# Patient Record
Sex: FEMALE | Race: AMERICAN INDIAN OR ALASKA NATIVE
[De-identification: names, ages, dates, MRNs, and addresses within clinical notes are randomized per-mention and may not be internally consistent; named-entity substitution may affect disease eponyms.]

---

## 2021-02-02 NOTE — HISTORY AND PHYSICAL REPORT
History of Present Illness


Chief complaint: 


Im weak





History of present illness: 


54 YO Female with SLE, RA, HTN present to ED for evaluation. Pt states "I feel 

weak on my left side". Pt states that she has experienced weakness and numbness 

on her left side for the past 1 week with persistent symptoms over the same time

frame. Pt transported to [facility] via private vehicle for further care and evaluation

of the aforementioned symptoms. Pt seen and evaluated in ED. All labs and 

imaging studies reviewed. The patient was found to have a neurologic deficit and

a code stroke was called. The patient was placed in observation status and 

admitted to medical floor and initiated on CVA protocol. Pt is allergic to 

aspirin and was initiated on antiplatelet therapy with plavix. Teleneurology 

consulted in ED. Pt denies fever, chills, CP, Palpitations, NVD, Trauma, 

productive cough, recent ill contacts, or known exposure to COVID 19. 





Past History


Past Medical History: arthritis, hypertension, other (See HPI)


Past Surgical History: cholecystectomy


Social history: single.  denies: smoking, alcohol abuse, prescription drug abuse


Family history: hypertension





Medications and Allergies


                                    Allergies











Allergy/AdvReac Type Severity Reaction Status Date / Time


 


aspirin Allergy  Unknown Verified 02/02/21 13:31


 


lisinopril Allergy  Unknown Verified 02/02/21 13:31


 


Penicillins Allergy  Swelling Verified 02/02/21 13:31


 


Sulfa (Sulfonamide Allergy  Unknown Verified 02/02/21 13:31





Antibiotics)     














Review of Systems


Constitutional: no weight loss, no weight gain, no fever, no chills


Ears, nose, mouth and throat: no ear pain, no ear discharge, no tinnitis, no 

decreased hearing, no nose pain


Breasts: no change in shape, no swelling, no mass


Cardiovascular: no chest pain, no orthopnea, no palpitations, no rapid/irregular

heart beat, no edema


Respiratory: no cough, no hemoptysis, no shortness of breath


Gastrointestinal: no abdominal pain, no nausea, no vomiting, no change in bowel 

habits, no hematemesis


Genitourinary Female: no pelvic pain, no flank pain, no dysuria, no urinary 

frequency, no urgency


Rectal: no pain, no incontinence, no bleeding


Musculoskeletal: no neck pain, no shooting arm pain, no arm numbness/tingling


Integumentary: no rash, no pruritis, no redness, no sores, no wounds


Neurological: no transient paralysis, no paralysis, no parathesias, no numbness,

no tingling, no seizures


Psychiatric: no anxiety, no change in sleep habits, no insomnia, no hypersomnia,

no change in libido, no disorientation


Endocrine: no cold intolerance, no polydipsia, no excessive sweating


Hematologic/Lymphatic: no easy bruising, no lymphadenopathy, no lymphedema


Allergic/Immunologic: no allergic rhinitis, no persistent infections, no 

angioedema





Exam





- Constitutional


Vitals: 


                                        











Temp Pulse Resp BP Pulse Ox


 


 98 F   73   20   182/114   100 


 


 02/02/21 13:41  02/02/21 13:37  02/02/21 13:37  02/02/21 13:37  02/02/21 13:37











General appearance: Present: mild distress





- EENT


Eyes: Present: PERRL


ENT: hearing intact, clear oral mucosa





- Neck


Neck: Present: supple, normal ROM





- Respiratory


Respiratory effort: normal


Respiratory: bilateral: CTA





- Cardiovascular


Heart Sounds: Present: S1 & S2.  Absent: rub, click





- Extremities


Extremities: pulses symmetrical, No edema


Peripheral Pulses: within normal limits





- Abdominal


General gastrointestinal: Present: soft, non-tender, non-distended, normal bowel

sounds


Female genitourinary: Present: normal





- Integumentary


Integumentary: Present: clear, warm, dry





- Musculoskeletal


Musculoskeletal: left sided weakness





- Psychiatric


Psychiatric: appropriate mood/affect, intact judgment & insight





- Neurologic


Neurologic: CNII-XII intact, moves all extremities





Results





- Labs


CBC & Chem 7: 


                                 02/02/21 13:57





                                 02/02/21 14:12


Labs: 


                              Abnormal lab results











  02/02/21 02/02/21 02/02/21 Range/Units





  13:38 13:57 14:12 


 


WBC   2.9 L   (4.5-11.0)  K/mm3


 


Lymph % (Auto)   39.9 H   (13.4-35.0)  %


 


Mono % (Auto)   13.2 H   (0.0-7.3)  %


 


Lymph # (Auto)   1.1 L   (1.2-5.4)  K/mm3


 


Seg Neutrophils #   1.3 L   (1.8-7.7)  K/mm3


 


POC Glucose  61 L    ()  mg/dL


 


CK-MB (CK-2)    4.2 H  (0.0-4.0)  ng/mL


 


CK-MB (CK-2) Rel Index    5.8 H  (0-4)  


 


Total Protein    9.5 H  (6.3-8.2)  g/dL














Assessment and Plan





- Patient Problems


(1) CVA (cerebral vascular accident)


Current Visit: Yes   Status: Acute   


Qualifiers: 


   CVA mechanism: occlusion   Precerebral and cerebral artery: posterior c

erebral artery   Laterality of affected vessel: left   Qualified Code(s): 

I63.532 - Cerebral infarction due to unspecified occlusion or stenosis of left 

posterior cerebral artery   


Plan to address problem: 


CVA Protocol: Physical therapy consulted, Speech therapy consulted, Occupational

therapy consulted, Antiplatelet therapy, CT Head, neuro check, lipid panel, 

statin therapy. 








(2) SLE (systemic lupus erythematosus related syndrome)


Current Visit: Yes   Status: Acute   


Plan to address problem: 


Continue plaquenil, supportive care, outpatient rheumatology f/u 








(3) Accelerated hypertension


Current Visit: Yes   Status: Acute   


Plan to address problem: 


Monitor BP q shift, continue medical management








(4) Rheumatoid arthritis


Current Visit: Yes   Status: Acute   


Plan to address problem: 


supportive care, continue medical management.








(5) DVT prophylaxis


Current Visit: Yes   Status: Acute   


Plan to address problem: 


SCD to BLE while in bed, supportive care.

## 2021-02-02 NOTE — EMERGENCY DEPARTMENT REPORT
ED Neuro Deficit HPI





- General


Chief Complaint: Neuro Symptoms/Deficit


Stated Complaint: POSS STROKE


Time Seen by Provider: 02/02/21 13:43


Source: patient


Mode of arrival: Wheelchair


Limitations: No Limitations





- History of Present Illness


Initial Comments: 


This is a 55-year-old female with a history of hypertension and lupus who states

that she has had weakness and numbness of her left side for 1 week.  She does 

not describe the symptoms as progressive nor does she complain of headache.  

Apparently she called the [facility] yesterday for advice.  She, has not been 

seen by medical provider during this period of time.  She was a little vague 

about whether or not she has had neuro symptoms related to her lupus in the 

past.  However largely I think she is saying that she has not.  At the same time

she tells me that she was "in a coma before".  As far as I can tell she required

intubation for ACE inhibitor related angioedema and was probably sedated for 

some time.  She is a very poor historian but is able to give general 

information.  She has not been to this facility apparently prior and there is no

[facility] records.





Patient states that her numbness involves her entire left hemicorporal body.  

She states that she has not been able to walk.  She states that she has little 

to no use of her left upper extremity.  At times she has slurring of the speech 

she states.  At the time my encounter it is really undetectable.





Patient denies a history of her lupus affecting her kidneys.  She states that 

her current lupus medicine is Plaquenil.  She states that she is compliant with 

her amlodipine/HCTZ.


-: Gradual, week(s)


Location: speech, left face, left arm


Presenting Symptoms: Present: Weak/Paralyzed One Side


History of same: No


Place: home


Severity: severe


Quality: weak, numb


Improves With: none


Worsens With: none


On Anticoagulants: No


Context: gradual onset


Associated Symptoms: denies other symptoms


Treatments Prior to Arrival: none





- Related Data


Allergies/Adverse Reactions: 


                                    Allergies











Allergy/AdvReac Type Severity Reaction Status Date / Time


 


aspirin Allergy  Unknown Verified 02/02/21 13:31


 


lisinopril Allergy  Unknown Verified 02/02/21 13:31


 


Penicillins Allergy  Swelling Verified 02/02/21 13:31


 


Sulfa (Sulfonamide Allergy  Unknown Verified 02/02/21 13:31





Antibiotics)     














ED Review of Systems


ROS: 


Stated complaint: POSS STROKE


Other details as noted in HPI








ED Past Medical Hx





- Past Medical History


Hx Hypertension: Yes


Hx Arthritis: Yes (RA)


Additional medical history: LUPUS/ THYROID





- Surgical History


Hx Cholecystectomy: Yes





- Social History


Smoking Status: Never Smoker


Substance Use Type: None





ED Neuro Physical Exam





- General


Limitations: No Limitations


General appearance: alert, in no apparent distress


Suspected Stroke: Yes





- Head


Head exam: Present: atraumatic, normocephalic





- Eye


Eye exam: Present: normal appearance.  Absent: scleral icterus





- ENT


ENT exam: Present: mucous membranes moist, other (Perhaps slight facial asym

metry left nasolabial fold slightly lower)





- Neck


Neck exam: Present: normal inspection.  Absent: tenderness, meningismus





- Respiratory


Respiratory exam: Present: normal lung sounds bilaterally.  Absent: respiratory 

distress





- Cardiovascular


Cardiovascular Exam: Present: regular rate, normal rhythm.  Absent: systolic 

murmur, diastolic murmur, rubs, gallop





- GI/Abdominal


GI/Abdominal exam: Present: soft, normal bowel sounds.  Absent: distended, 

tenderness, guarding, rebound, rigid





- Extremities Exam


Extremities exam: Present: normal inspection





- Back Exam


Back exam: Present: normal inspection





- Neurological Exam


Neurological exam: Present: alert, oriented X3, CN II-XII intact (Question 

slight asymmetry left), motor sensory deficit





- NIHSS


Assessment Interval: Baseline


1a. Level of Consciousness: alert/keenly responsive


1b. LOC Questions: answers both correctly


1c. LOC Commands: performs tasks correctly


2. Best Gaze: normal


3. Visual: no visual loss


4. Facial Palsy: normal symmetrical movement


5b. Motor Arm Right: no drift


5a. Motor Arm Left: no movement


6a. Motor Leg Left: no gravity effort


6b. Motor Leg Right: no drift


7. Limb Ataxia: absent


8. Sensory: mild/moderate sensory loss


9. Best Language: no aphasia


10. Dysarthria: normal


11. Extinction/Inattention: no abnormality


Total Score: 8


Stroke Severity: Moderate Stroke





- Psychiatric


Psychiatric exam: Present: normal affect, normal mood





- Skin


Skin exam: Present: warm, dry, intact, normal color.  Absent: rash





ED Course


                                   Vital Signs











  02/02/21 02/02/21





  13:37 13:41


 


Temperature  98 F


 


Pulse Rate 73 


 


Respiratory 20 





Rate  


 


Blood Pressure 182/114 


 


O2 Sat by Pulse 100 





Oximetry  














- Reevaluation(s)


Reevaluation #1: 


Telemetry neuro consult is requested.


02/02/21 14:36





Reevaluation #2: 


Discussed with telemetry neuro.  They will see patient and make recommendations.

 Discussed with hospitalist.  He will see patient and make disposition.  I will 

hold off on aspirin because the patient is allergic.  We will defer Plavix and 

possible other work-up and therapy to the above physicians.


02/02/21 15:06





Reevaluation #3: 


Discussed with teleneurologist.  See their note for recommendations.  Further 

care per hospitalist staff.


02/02/21 15:28








- Lab Data


Result diagrams: 


                                 02/02/21 13:57





                                 02/02/21 14:12


                                   Lab Results











  02/02/21 02/02/21 02/02/21 Range/Units





  13:38 13:57 13:57 


 


WBC   2.9 L   (4.5-11.0)  K/mm3


 


RBC   4.42   (3.65-5.03)  M/mm3


 


Hgb   14.0   (10.1-14.3)  gm/dl


 


Hct   41.2   (30.3-42.9)  %


 


MCV   93   (79-97)  fl


 


MCH   32   (28-32)  pg


 


MCHC   34   (30-34)  %


 


RDW   13.7   (13.2-15.2)  %


 


Plt Count   249   (140-440)  K/mm3


 


Lymph % (Auto)   39.9 H   (13.4-35.0)  %


 


Mono % (Auto)   13.2 H   (0.0-7.3)  %


 


Eos % (Auto)   0.4   (0.0-4.3)  %


 


Baso % (Auto)   0.4   (0.0-1.8)  %


 


Lymph # (Auto)   1.1 L   (1.2-5.4)  K/mm3


 


Mono # (Auto)   0.4   (0.0-0.8)  K/mm3


 


Eos # (Auto)   0.0   (0.0-0.4)  K/mm3


 


Baso # (Auto)   0.0   (0.0-0.1)  K/mm3


 


Seg Neutrophils %   46.1   (40.0-70.0)  %


 


Seg Neutrophils #   1.3 L   (1.8-7.7)  K/mm3


 


PT    12.2  (12.2-14.9)  Sec.


 


INR    0.92  (0.87-1.13)  


 


APTT    26.9  (24.2-36.6)  Sec.


 


Sodium     (137-145)  mmol/L


 


Potassium     (3.6-5.0)  mmol/L


 


Chloride     ()  mmol/L


 


Carbon Dioxide     (22-30)  mmol/L


 


Anion Gap     mmol/L


 


BUN     (7-17)  mg/dL


 


Creatinine     (0.6-1.2)  mg/dL


 


Estimated GFR     ml/min


 


BUN/Creatinine Ratio     %


 


Glucose     ()  mg/dL


 


POC Glucose  61 L    ()  mg/dL


 


Calcium     (8.4-10.2)  mg/dL


 


Magnesium     (1.7-2.3)  mg/dL


 


Total Bilirubin     (0.1-1.2)  mg/dL


 


Direct Bilirubin     (0-0.2)  mg/dL


 


Indirect Bilirubin     mg/dL


 


AST     (5-40)  units/L


 


ALT     (7-56)  units/L


 


Alkaline Phosphatase     ()  units/L


 


Total Creatine Kinase     ()  units/L


 


CK-MB (CK-2)     (0.0-4.0)  ng/mL


 


CK-MB (CK-2) Rel Index     (0-4)  


 


C-Reactive Protein     (0.00-1.30)  mg/dL


 


Total Protein     (6.3-8.2)  g/dL


 


Albumin     (3.9-5)  g/dL


 


Albumin/Globulin Ratio     %














  02/02/21 02/02/21 Range/Units





  14:12 14:12 


 


WBC    (4.5-11.0)  K/mm3


 


RBC    (3.65-5.03)  M/mm3


 


Hgb    (10.1-14.3)  gm/dl


 


Hct    (30.3-42.9)  %


 


MCV    (79-97)  fl


 


MCH    (28-32)  pg


 


MCHC    (30-34)  %


 


RDW    (13.2-15.2)  %


 


Plt Count    (140-440)  K/mm3


 


Lymph % (Auto)    (13.4-35.0)  %


 


Mono % (Auto)    (0.0-7.3)  %


 


Eos % (Auto)    (0.0-4.3)  %


 


Baso % (Auto)    (0.0-1.8)  %


 


Lymph # (Auto)    (1.2-5.4)  K/mm3


 


Mono # (Auto)    (0.0-0.8)  K/mm3


 


Eos # (Auto)    (0.0-0.4)  K/mm3


 


Baso # (Auto)    (0.0-0.1)  K/mm3


 


Seg Neutrophils %    (40.0-70.0)  %


 


Seg Neutrophils #    (1.8-7.7)  K/mm3


 


PT    (12.2-14.9)  Sec.


 


INR    (0.87-1.13)  


 


APTT    (24.2-36.6)  Sec.


 


Sodium  138   (137-145)  mmol/L


 


Potassium  4.1   (3.6-5.0)  mmol/L


 


Chloride  105.2   ()  mmol/L


 


Carbon Dioxide  27   (22-30)  mmol/L


 


Anion Gap  10   mmol/L


 


BUN  8   (7-17)  mg/dL


 


Creatinine  0.7   (0.6-1.2)  mg/dL


 


Estimated GFR  > 60   ml/min


 


BUN/Creatinine Ratio  11   %


 


Glucose  91   ()  mg/dL


 


POC Glucose    ()  mg/dL


 


Calcium  10.2   (8.4-10.2)  mg/dL


 


Magnesium  2.00   (1.7-2.3)  mg/dL


 


Total Bilirubin  0.30   (0.1-1.2)  mg/dL


 


Direct Bilirubin  < 0.2   (0-0.2)  mg/dL


 


Indirect Bilirubin  0.1   mg/dL


 


AST  25   (5-40)  units/L


 


ALT  21   (7-56)  units/L


 


Alkaline Phosphatase  93   ()  units/L


 


Total Creatine Kinase  72   ()  units/L


 


CK-MB (CK-2)  4.2 H   (0.0-4.0)  ng/mL


 


CK-MB (CK-2) Rel Index  5.8 H   (0-4)  


 


C-Reactive Protein   0.10  (0.00-1.30)  mg/dL


 


Total Protein  9.5 H   (6.3-8.2)  g/dL


 


Albumin  4.4   (3.9-5)  g/dL


 


Albumin/Globulin Ratio  0.9   %











                         Laboratory Results - last 24 hr











  02/02/21 02/02/21





  13:57 13:57


 


WBC  2.9 L 


 


RBC  4.42 


 


Hgb  14.0 


 


Hct  41.2 


 


MCV  93 


 


MCH  32 


 


MCHC  34 


 


RDW  13.7 


 


Plt Count  249 


 


Lymph % (Auto)  39.9 H 


 


Mono % (Auto)  13.2 H 


 


Eos % (Auto)  0.4 


 


Baso % (Auto)  0.4 


 


Lymph # (Auto)  1.1 L 


 


Mono # (Auto)  0.4 


 


Eos # (Auto)  0.0 


 


Baso # (Auto)  0.0 


 


Seg Neutrophils %  46.1 


 


Seg Neutrophils #  1.3 L 


 


PT   12.2


 


INR   0.92


 


APTT   26.9











                         Laboratory Results - last 24 hr











  02/02/21 02/02/21 02/02/21





  13:38 13:57 13:57


 


WBC   2.9 L 


 


RBC   4.42 


 


Hgb   14.0 


 


Hct   41.2 


 


MCV   93 


 


MCH   32 


 


MCHC   34 


 


RDW   13.7 


 


Plt Count   249 


 


Lymph % (Auto)   39.9 H 


 


Mono % (Auto)   13.2 H 


 


Eos % (Auto)   0.4 


 


Baso % (Auto)   0.4 


 


Lymph # (Auto)   1.1 L 


 


Mono # (Auto)   0.4 


 


Eos # (Auto)   0.0 


 


Baso # (Auto)   0.0 


 


Seg Neutrophils %   46.1 


 


Seg Neutrophils #   1.3 L 


 


PT    12.2


 


INR    0.92


 


APTT    26.9


 


Sodium   


 


Potassium   


 


Chloride   


 


Carbon Dioxide   


 


Anion Gap   


 


BUN   


 


Creatinine   


 


Estimated GFR   


 


BUN/Creatinine Ratio   


 


Glucose   


 


POC Glucose  61 L  


 


Calcium   


 


Magnesium   


 


Total Bilirubin   


 


Direct Bilirubin   


 


Indirect Bilirubin   


 


AST   


 


ALT   


 


Alkaline Phosphatase   


 


Total Creatine Kinase   


 


CK-MB (CK-2)   


 


CK-MB (CK-2) Rel Index   


 


Total Protein   


 


Albumin   


 


Albumin/Globulin Ratio   














  02/02/21





  14:12


 


WBC 


 


RBC 


 


Hgb 


 


Hct 


 


MCV 


 


MCH 


 


MCHC 


 


RDW 


 


Plt Count 


 


Lymph % (Auto) 


 


Mono % (Auto) 


 


Eos % (Auto) 


 


Baso % (Auto) 


 


Lymph # (Auto) 


 


Mono # (Auto) 


 


Eos # (Auto) 


 


Baso # (Auto) 


 


Seg Neutrophils % 


 


Seg Neutrophils # 


 


PT 


 


INR 


 


APTT 


 


Sodium  138


 


Potassium  4.1


 


Chloride  105.2


 


Carbon Dioxide  27


 


Anion Gap  10


 


BUN  8


 


Creatinine  0.7


 


Estimated GFR  > 60


 


BUN/Creatinine Ratio  11


 


Glucose  91


 


POC Glucose 


 


Calcium  10.2


 


Magnesium  2.00


 


Total Bilirubin  0.30


 


Direct Bilirubin  < 0.2


 


Indirect Bilirubin  0.1


 


AST  25


 


ALT  21


 


Alkaline Phosphatase  93


 


Total Creatine Kinase  72


 


CK-MB (CK-2)  4.2 H


 


CK-MB (CK-2) Rel Index  5.8 H


 


Total Protein  9.5 H


 


Albumin  4.4


 


Albumin/Globulin Ratio  0.9














- EKG Data


-: EKG Interpreted by Me


EKG shows normal: sinus rhythm (First-degree), axis (Left foot axis), intervals,

ST-T waves


Rate: normal


Interpretation: LVH (LVH, associated repolarization abnormality, left atrial 

enlargement, first-degree AV block)





- Radiology Data


Radiology results: image reviewed (I do not see any evidence of an acute 

intracranial process.  Report is pending.)


No acute process seen by me or teleneurologist.  Report is pending.  Old 

occipital infarct.


Critical care attestation.: 


If time is entered above; I have spent that time in minutes in the direct care 

of this critically ill patient, excluding procedure time.








ED Disposition


Clinical Impression: 


CVA (cerebral vascular accident)


Qualifiers:


 CVA mechanism: unspecified Qualified Code(s): I63.9 - Cerebral infarction, 

unspecified





Disposition: DC-09 OP ADMIT IP TO THIS HOSP


Is pt being admited?: Yes


Does the pt Need Aspirin: No (Allergic to aspirin)


Condition: Stable


Referrals: 


PRIMARY CARE,MD [Primary Care Provider] - 3-5 Days


Time of Disposition: 15:29

## 2021-02-02 NOTE — EMERGENCY DEPARTMENT REPORT
ED Neuro Deficit HPI





- General


Chief Complaint: Neuro Symptoms/Deficit


Stated Complaint: POSS STROKE


Time Seen by Provider: 02/02/21 13:43


Source: patient


Mode of arrival: Wheelchair


Limitations: No Limitations





- History of Present Illness


Initial Comments: 





[facility]


[facility]





Stat Consult





Date of Service:   02/02/2021 14:33:40





Impression:


      I63.332 - Cerebrovascular accident (CVA) due to thrombosis of left 

posterior cerebral artery (HCCC)





Comments/Sign-Out:


The lesion is reported by radiology to me appear to be subacute consistent with 

her symptoms. She needs MRI the brain, A1c, fasting lipid profile, EKG 

monitoring, echocardiogram, carotid Doppler, rehab services evaluation, blood 

pressure control.





CT HEAD:


Showed No Acute Hemorrhage or Acute Core Infarct


Not Reviewed


Unable to access images. Reported to show infarcts in the left occipital lobe 

and cuneus.





Metrics:


[facility] Notification Time: 02/02/2021 14:31:35


Stamp Time: 02/02/2021 14:33:40


Callback Response Time: 02/02/2021 14:39:12





Our recommendations are outlined below.





Recommendations:


      Initiate Aspirin 81 MG Daily


 


Imaging Studies:


      MRI Head


      Carotid Dopplers





Therapies:


      Physical Therapy, Occupational Therapy, Speech Therapy Assessment When 

Applicable





Other WorkUp:


      Check B12 level





Disposition:


Neurology Follow Up Recommended





Sign Out:


      Discussed with Emergency Department Provider





 

--------------------------------------------------------------------------------


--------------------





Chief Complaint:


Left-sided weakness





History of Present Illness:


Patient is a 55 year old Female.





This 55-year-old woman had sudden onset seven days ago of severe weakness on the

left side and some mild slurred speech. She decided that she would wait for an 

upcoming doctor's appointment. In addition to risk factors below she has a 

history of lupus. She denies prior stroke.


 





Past Medical History:


     Hypertension


     There is NO history of Diabetes Mellitus


     There is NO history of Hyperlipidemia


     There is NO history of Atrial Fibrillation


     There is NO history of Coronary Artery Disease


     There is NO history of Stroke





Anticoagulant use:  No





Antiplatelet use: No





 





Examination:


BP(171/104), Pulse(73), Blood Glucose(91, 61)


1A: Level of Consciousness - Alert; keenly responsive + 0


1B: Ask Month and Age - Both Questions Right + 0


1C: Blink Eyes & Squeeze Hands - Performs Both Tasks + 0


2: Test Horizontal Extraocular Movements - Normal + 0


3: Test Visual Fields - Partial Hemianopia + 1


4: Test Facial Palsy (Use Grimace if Obtunded) - Minor paralysis (flat 

nasolabial fold, smile asymmetry) + 1


5A: Test Left Arm Motor Drift - No Effort Against Gravity + 3


5B: Test Right Arm Motor Drift - No Drift for 10 Seconds + 0


6A: Test Left Leg Motor Drift - No Effort Against Gravity + 3


6B: Test Right Leg Motor Drift - No Drift for 5 Seconds + 0


7: Test Limb Ataxia (FNF/Heel-Shin) - No Ataxia + 0


8: Test Sensation - Mild-Moderate Loss: Less Sharp/More Dull + 1


9: Test Language/Aphasia - Normal; No aphasia + 0


10: Test Dysarthria - Mild-Moderate Dysarthria: Slurring but can be understood +

1


11: Test Extinction/Inattention - No abnormality + 0





NIHSS Score: 10








Due to the immediate potential for life-threatening deterioration due to 

underlying acute neurologic illness, I spent 25 minutes providing critical care.

This time includes time for face to face visit via telemedicine, review of 

medical records, imaging studies and discussion of findings with providers, the 

patient and/or family.








Dr Mitchel Solorio








[facility]


[phone]





Case [id number]


 


Location: speech, left face, left arm


History of same: No


Place: home


Severity: severe


Quality: weak, numb


Improves With: none


Worsens With: none


On Anticoagulants: No


Treatments Prior to Arrival: none





- Related Data


Allergies/Adverse Reactions: 


                                    Allergies











Allergy/AdvReac Type Severity Reaction Status Date / Time


 


aspirin Allergy  Unknown Verified 02/02/21 13:31


 


lisinopril Allergy  Unknown Verified 02/02/21 13:31


 


Penicillins Allergy  Swelling Verified 02/02/21 13:31


 


Sulfa (Sulfonamide Allergy  Unknown Verified 02/02/21 13:31





Antibiotics)     














ED Review of Systems


ROS: 


Stated complaint: POSS STROKE


Other details as noted in HPI








ED Past Medical Hx





- Past Medical History


Hx Hypertension: Yes


Hx Arthritis: Yes (RA)


Additional medical history: LUPUS/ THYROID





- Surgical History


Hx Cholecystectomy: Yes





- Social History


Smoking Status: Never Smoker


Substance Use Type: None





ED Neuro Physical Exam





- General


Limitations: No Limitations


General appearance: alert, in no apparent distress


Suspected Stroke: Yes





- NIHSS


Assessment Interval: Baseline


1a. Level of Consciousness: alert/keenly responsive


1b. LOC Questions: answers both correctly


1c. LOC Commands: performs tasks correctly


2. Best Gaze: normal


3. Visual: complete hemianopia


4. Facial Palsy: minor paralysis


5b. Motor Arm Right: no drift


5a. Motor Arm Left: no gravity effort


6a. Motor Leg Left: no gravity effort


6b. Motor Leg Right: no drift


7. Limb Ataxia: absent


8. Sensory: mild/moderate sensory loss


9. Best Language: no aphasia


10. Dysarthria: mild/moderate dysarthria


11. Extinction/Inattention: no abnormality


Total Score: 11


Stroke Severity: Moderate Stroke





ED Course





                                   Vital Signs











  02/02/21 02/02/21





  13:37 13:41


 


Temperature  98 F


 


Pulse Rate 73 


 


Respiratory 20 





Rate  


 


Blood Pressure 182/114 


 


O2 Sat by Pulse 100 





Oximetry  














- Lab Data


Result diagrams: 


                                 02/02/21 13:57





                                 02/02/21 14:12





                                   Lab Results











  02/02/21 02/02/21 02/02/21 Range/Units





  13:38 13:57 13:57 


 


WBC   2.9 L   (4.5-11.0)  K/mm3


 


RBC   4.42   (3.65-5.03)  M/mm3


 


Hgb   14.0   (10.1-14.3)  gm/dl


 


Hct   41.2   (30.3-42.9)  %


 


MCV   93   (79-97)  fl


 


MCH   32   (28-32)  pg


 


MCHC   34   (30-34)  %


 


RDW   13.7   (13.2-15.2)  %


 


Plt Count   249   (140-440)  K/mm3


 


Lymph % (Auto)   39.9 H   (13.4-35.0)  %


 


Mono % (Auto)   13.2 H   (0.0-7.3)  %


 


Eos % (Auto)   0.4   (0.0-4.3)  %


 


Baso % (Auto)   0.4   (0.0-1.8)  %


 


Lymph # (Auto)   1.1 L   (1.2-5.4)  K/mm3


 


Mono # (Auto)   0.4   (0.0-0.8)  K/mm3


 


Eos # (Auto)   0.0   (0.0-0.4)  K/mm3


 


Baso # (Auto)   0.0   (0.0-0.1)  K/mm3


 


Seg Neutrophils %   46.1   (40.0-70.0)  %


 


Seg Neutrophils #   1.3 L   (1.8-7.7)  K/mm3


 


ESR     (0-20)  mm/Hr


 


PT    12.2  (12.2-14.9)  Sec.


 


INR    0.92  (0.87-1.13)  


 


APTT    26.9  (24.2-36.6)  Sec.


 


Sodium     (137-145)  mmol/L


 


Potassium     (3.6-5.0)  mmol/L


 


Chloride     ()  mmol/L


 


Carbon Dioxide     (22-30)  mmol/L


 


Anion Gap     mmol/L


 


BUN     (7-17)  mg/dL


 


Creatinine     (0.6-1.2)  mg/dL


 


Estimated GFR     ml/min


 


BUN/Creatinine Ratio     %


 


Glucose     ()  mg/dL


 


POC Glucose  61 L    ()  mg/dL


 


Calcium     (8.4-10.2)  mg/dL


 


Magnesium     (1.7-2.3)  mg/dL


 


Total Bilirubin     (0.1-1.2)  mg/dL


 


Direct Bilirubin     (0-0.2)  mg/dL


 


Indirect Bilirubin     mg/dL


 


AST     (5-40)  units/L


 


ALT     (7-56)  units/L


 


Alkaline Phosphatase     ()  units/L


 


Total Creatine Kinase     ()  units/L


 


CK-MB (CK-2)     (0.0-4.0)  ng/mL


 


CK-MB (CK-2) Rel Index     (0-4)  


 


C-Reactive Protein     (0.00-1.30)  mg/dL


 


Total Protein     (6.3-8.2)  g/dL


 


Albumin     (3.9-5)  g/dL


 


Albumin/Globulin Ratio     %














  02/02/21 02/02/21 02/02/21 Range/Units





  14:12 14:12 14:12 


 


WBC     (4.5-11.0)  K/mm3


 


RBC     (3.65-5.03)  M/mm3


 


Hgb     (10.1-14.3)  gm/dl


 


Hct     (30.3-42.9)  %


 


MCV     (79-97)  fl


 


MCH     (28-32)  pg


 


MCHC     (30-34)  %


 


RDW     (13.2-15.2)  %


 


Plt Count     (140-440)  K/mm3


 


Lymph % (Auto)     (13.4-35.0)  %


 


Mono % (Auto)     (0.0-7.3)  %


 


Eos % (Auto)     (0.0-4.3)  %


 


Baso % (Auto)     (0.0-1.8)  %


 


Lymph # (Auto)     (1.2-5.4)  K/mm3


 


Mono # (Auto)     (0.0-0.8)  K/mm3


 


Eos # (Auto)     (0.0-0.4)  K/mm3


 


Baso # (Auto)     (0.0-0.1)  K/mm3


 


Seg Neutrophils %     (40.0-70.0)  %


 


Seg Neutrophils #     (1.8-7.7)  K/mm3


 


ESR   48   (0-20)  mm/Hr


 


PT     (12.2-14.9)  Sec.


 


INR     (0.87-1.13)  


 


APTT     (24.2-36.6)  Sec.


 


Sodium  138    (137-145)  mmol/L


 


Potassium  4.1    (3.6-5.0)  mmol/L


 


Chloride  105.2    ()  mmol/L


 


Carbon Dioxide  27    (22-30)  mmol/L


 


Anion Gap  10    mmol/L


 


BUN  8    (7-17)  mg/dL


 


Creatinine  0.7    (0.6-1.2)  mg/dL


 


Estimated GFR  > 60    ml/min


 


BUN/Creatinine Ratio  11    %


 


Glucose  91    ()  mg/dL


 


POC Glucose     ()  mg/dL


 


Calcium  10.2    (8.4-10.2)  mg/dL


 


Magnesium  2.00    (1.7-2.3)  mg/dL


 


Total Bilirubin  0.30    (0.1-1.2)  mg/dL


 


Direct Bilirubin  < 0.2    (0-0.2)  mg/dL


 


Indirect Bilirubin  0.1    mg/dL


 


AST  25    (5-40)  units/L


 


ALT  21    (7-56)  units/L


 


Alkaline Phosphatase  93    ()  units/L


 


Total Creatine Kinase  72    ()  units/L


 


CK-MB (CK-2)  4.2 H    (0.0-4.0)  ng/mL


 


CK-MB (CK-2) Rel Index  5.8 H    (0-4)  


 


C-Reactive Protein    0.10  (0.00-1.30)  mg/dL


 


Total Protein  9.5 H    (6.3-8.2)  g/dL


 


Albumin  4.4    (3.9-5)  g/dL


 


Albumin/Globulin Ratio  0.9    %











Critical care attestation.: 


If time is entered above; I have spent that time in minutes in the direct care 

of this critically ill patient, excluding procedure time.








ED Disposition


Clinical Impression: 


CVA (cerebral vascular accident)


Qualifiers:


 CVA mechanism: occlusion Precerebral and cerebral artery: posterior cerebral 

artery Laterality of affected vessel: left Qualified Code(s): I63.532 - Cerebral

infarction due to unspecified occlusion or stenosis of left posterior cerebral 

artery





Disposition: DC-09 OP ADMIT IP TO THIS HOSP


Is pt being admited?: Yes


Condition: Stable


Referrals: 


PRIMARY CARE,MD [Primary Care Provider] - 3-5 Days

## 2021-02-02 NOTE — EVENT NOTE
ED Screening Note


ED Screening Note: 





Patient is a 55-year-old female presents emergency room complaints of left upper

extremity and left lower extremity weakness that began on 1/25/2021


Patient's sister states that her speech also seems slightly slurred at times


She receives her care at [facility] [facility]


She states that she has an upcoming appointment for tomorrow at the clinic


She has not seen anyone for these symptoms


She states that she does have some mild exertional shortness of breath


She denies any chest pain, vision changes


Past medical history of lupus


Allergy to aspirin, lisinopril, penicillin, sulfa





This initial assessment/diagnostic orders/clinical plan/treatment(s) is/are 

subject to change based on patients health status, clinical progression and re-

assessment by fellow clinical providers in the ED. Further treatment and workup 

at subsequent clinical providers discretion. Patient/guardian urged not to elope

from the ED as their condition may be serious if not clinically assessed and 

managed. 





Initial orders include: 


Stroke work-up, cardiac work-up for shortness of breath

## 2021-02-02 NOTE — XRAY REPORT
CHEST 1 VIEW 



INDICATION:  hypertension.



COMPARISON:  None



FINDINGS:

Support devices: None. 



Heart: Within normal limits. 

Lungs/Pleura: No acute air space or interstitial disease. 



Additional findings: None.



IMPRESSION:

 No acute findings.



Signer Name: Mark Montoya Jr, MD 

Signed: 2/2/2021 3:14 PM

Workstation Name: [id number]

## 2021-02-03 NOTE — PROGRESS NOTE
Assessment and Plan





A/p


- Patient Problems


(1) CVA (cerebral vascular accident)


Current Visit: Yes   Status: Acute   


Qualifiers: 


   CVA mechanism: occlusion   Precerebral and cerebral artery: posterior 

cerebral artery   Laterality of affected vessel: left   Qualified Code(s): I63.

532 - Cerebral infarction due to unspecified occlusion or stenosis of left 

posterior cerebral artery   


Plan to address problem: 


CVA Protocol: Physical therapy consulted, Speech therapy consulted, Occupational

therapy consulted, Antiplatelet therapy, CT Head, neuro check, lipid panel, 

statin therapy. 


MRI brain pending, left sided weakness persists


Able to walk some withsupport





(2) SLE (systemic lupus erythematosus related syndrome)


Current Visit: Yes   Status: Acute   


Plan to address problem: 


Continue plaquenil, supportive care, outpatient rheumatology f/u 








(3) Accelerated hypertension


Current Visit: Yes   Status: Acute   


Plan to address problem: 


Medications adjusted





(4) Rheumatoid arthritis


Current Visit: Yes   Status: Acute   


Plan to address problem: 


supportive care, continue medical management.


Continue Plaquenil





(5) DVT prophylaxis


Current Visit: Yes   Status: Acute   


Plan to address problem: 


SCD to BLE while in bed, supportive care. 











Subjective


Date of service: 02/03/21


Principal diagnosis: CVA with left-sided weakness


Interval history: 





56 YO Female with SLE, RA, HTN present to ED for evaluation. Pt states "I feel 

weak on my left side". Pt states that she has experienced weakness and numbness 

on her left side for the past 1 week with persistent symptoms over the same time

frame. Pt transported to [facility] via private vehicle for further care and evaluation

of the aforementioned symptoms. Pt seen and evaluated in ED. All labs and 

imaging studies reviewed. The patient was found to have a neurologic deficit and

a code stroke was called. The patient was placed in observation status and admit

gen to medical floor and initiated on CVA protocol. Pt is allergic to aspirin 

and was initiated on antiplatelet therapy with plavix. Teleneurology consulted 

in ED. Pt denies fever, chills, CP, Palpitations, NVD, Trauma, productive cough,

recent ill contacts, or known exposure to COVID 19. 





2/3/2021


Left-sided weakness persists


Echocardiogram and carotid Doppler scan reviewed


 MRI brain pending











Objective





- Constitutional


Vitals: 


                               Vital Signs - 12hr











  02/03/21 02/03/21 02/03/21





  10:00 10:06 11:50


 


Temperature  98.5 F 97.6 F


 


Pulse Rate 83 74 


 


Respiratory  18 18





Rate   


 


Blood Pressure  147/82 149/97


 


O2 Sat by Pulse  100 100





Oximetry   














  02/03/21





  14:05


 


Temperature 


 


Pulse Rate 


 


Respiratory 





Rate 


 


Blood Pressure 


 


O2 Sat by Pulse 95





Oximetry 











General appearance: Present: no acute distress, well-nourished





- EENT


Eyes: PERRL, EOM intact


ENT: hearing intact, clear oral mucosa


Ears: bilateral: normal





- Neck


Neck: supple, normal ROM





- Respiratory


Respiratory effort: normal


Respiratory: bilateral: CTA





- Breasts


Breasts: normal





- Cardiovascular


Heart rate: 78


Rhythm: regular


Heart Sounds: Present: S1 & S2.  Absent: gallop, rub


Extremities: pulses intact, No edema, normal color, Full ROM





- Gastrointestinal


General gastrointestinal: Present: soft, non-tender, non-distended, normal bowel

sounds





- Genitourinary


Female genitourinary: normal





- Integumentary


Integumentary: clear, warm, dry





- Musculoskeletal


Musculoskeletal: left sided weakness





- Neurologic


Neurologic: focal deficits (Left hemiplegia)





- Psychiatric


Psychiatric: memory intact, appropriate mood/affect, intact judgment & insight





- Labs


CBC & Chem 7: 


                                 02/02/21 13:57





                                 02/02/21 14:12





HEART Score





- HEART Score


Troponin: 


                                        











Troponin T  < 0.010 ng/mL (0.00-0.029)   02/02/21  13:57

## 2021-02-03 NOTE — VASCULAR LAB REPORT
BILATERAL CAROTID DOPPLER ULTRASOUND



INDICATION : stroke



TECHNIQUE:  Grayscale and color Doppler imaging performed through the neck.



COMPARISON:  None



FINDINGS:



Right:   There is minimal partially calcified plaque in the carotid bulb. Peak systolic velocity in t
he CCA is 85 cm/s with end-diastolic velocity of 23 cm/s. Peak systolic velocity in the proximal ICA 
is 90 cm/s with end-diastolic velocity of 22 cm/s. ICA to CCA ratio is less than 2. There is antegrad
e  flow in the ECA and the vertebral artery. 



Left: There is no significant atherosclerotic disease. Peak systolic velocity in the CCA is 75 cm/s w
ith end-diastolic velocity of 22 cm/s. Peak systolic velocity in the proximal ICA is 97 cm/s with end
-diastolic velocity of 38 cm/s. ICA to CCA ratio is less than 2. There is antegrade  flow in the ECA 
and the vertebral artery. 



IMPRESSION: No hemodynamically significant stenosis by NASCET criteria. Doppler velocities indicate l
ess than 50% luminal narrowing bilaterally.



Signer Name: Mark Montoya Jr, MD 

Signed: 2/3/2021 1:36 PM

Workstation Name: [id number]

## 2021-02-04 NOTE — PROGRESS NOTE
Assessment and Plan


(1) CVA (cerebral vascular accident)


Current Visit: Yes   Status: Acute   


Qualifiers: 


   CVA mechanism: occlusion   Precerebral and cerebral artery: posterior 

cerebral artery   Laterality of affected vessel: left   Qualified Code(s): 

I63.532 - Cerebral infarction due to unspecified occlusion or stenosis of left 

posterior cerebral artery   


Plan to address problem: 


CVA Protocol: Physical therapy consulted, Speech therapy consulted, Occupational

therapy consulted, Antiplatelet therapy, CT Head, neuro check, lipid panel, 

statin therapy. 


MRI brain pending, left sided weakness I did not see a lot of weakness.  Patient

was 5 out of 5 strength in both abduction and abduction and also rotation at the

left shoulder joint.  Patient did complain of pain however when she moved joint.

 Patient has been going to see a physician about this for quite some time this 

is not acute.  Need to obtain MRI to rule out CVA.  Patient does not appear to 

need acute rehab at this time at all.  Patient able to get up walk around 

without any problems with gait.  Get up and go test appears to be limited but 

adequate.  Patient has significant inconsistencies with strength in left arm 

left leg.  Will aggressively treat pain control give patient back her Percocet.


Able to walk some withsupport





(2) SLE (systemic lupus erythematosus related syndrome)


Current Visit: Yes   Status: Acute   


Plan to address problem: 


Continue plaquenil, supportive care, outpatient rheumatology f/u 


Does not appear to be a lupus flare no joint inflammation no fever.  No 

myalgias.








(3) Accelerated hypertension


Current Visit: Yes   Status: Acute   


Plan to address problem: 


Medications adjusted however remains uncontrolled we will add amlodipine





(4) Rheumatoid arthritis


Current Visit: Yes   Status: Acute   


Plan to address problem: 


supportive care, continue medical management.


Continue Plaquenil





(5) DVT prophylaxis


Current Visit: Yes   Status: Acute   


Plan to address problem: 


SCD to BLE while in bed, supportive care. 














Subjective


Date of service: 02/04/21


Principal diagnosis: CVA with left-sided weakness


Interval history: 


54 YO Female with SLE, RA, HTN present to ED for evaluation. Pt states "I feel 

weak on my left side". Pt states that she has experienced weakness and numbness 

on her left side for the past 1 week with persistent symptoms over the same time

frame. Pt transported to [facility] via private vehicle for further care and evaluation

of the aforementioned symptoms. Pt seen and evaluated in ED. All labs and 

imaging studies reviewed. The patient was found to have a neurologic deficit and

a code stroke was called. The patient was placed in observation status and 

admitted to medical floor and initiated on CVA protocol. Pt is allergic to 

aspirin and was initiated on antiplatelet therapy with plavix. Teleneurology 

consulted in ED. Pt denies fever, chills, CP, Palpitations, NVD, Trauma, 

productive cough, recent ill contacts, or known exposure to COVID 19. 





2/4: Patient today gives history of continue left arm weakness.  Patient however

states this has been going on for a long time.  Patient states she is in pain fr

om her left arm and neck.  She has been going to the doctor for quite some time 

to receive pain medications for the left arm numbness and pain.  Patient 

scheduled to have an MRI today to evaluate for acute CVA.  Will initiate pain 

control Percocet.  Follow-up MRI results.  Has risk of systemic lupus 

erythematous.  I did discuss echocardiogram results with patient and Doppler 

findings.  Head CT unremarkable.  No signs of significant stenosis and 

echocardiogram 55 to 60%.











Objective





- Constitutional


Vitals: 


                               Vital Signs - 12hr











  02/03/21 02/03/21 02/03/21





  22:00 22:10 23:35


 


Temperature   98.4 F


 


Pulse Rate 72  70


 


Respiratory   16





Rate   


 


Blood Pressure   151/94


 


O2 Sat by Pulse 99 100 96





Oximetry   














  02/04/21





  03:16


 


Temperature 97.7 F


 


Pulse Rate 64


 


Respiratory 14





Rate 


 


Blood Pressure 130/77


 


O2 Sat by Pulse 100





Oximetry 











General appearance: Present: no acute distress, well-nourished





- EENT


Eyes: PERRL, EOM intact


ENT: hearing intact, clear oral mucosa


Ears: bilateral: normal





- Neck


Neck: supple, normal ROM





- Respiratory


Respiratory effort: normal


Respiratory: bilateral: CTA





- Breasts


Breasts: normal





- Cardiovascular


Rhythm: regular


Heart Sounds: Present: S1 & S2.  Absent: gallop, rub


Extremities: pulses intact, No edema, normal color, Full ROM





- Gastrointestinal


General gastrointestinal: Present: soft, non-tender, non-distended, normal bowel

sounds





- Genitourinary


Female genitourinary: normal





- Integumentary


Integumentary: clear, warm, dry





- Musculoskeletal


Musculoskeletal: left sided weakness, other (Left-sided weakness and pain with 

abduction)





- Neurologic


Neurologic: focal deficits, moves all extremities, other (Patient does have a 

focal deficit minimal on the left side with slightly decreased strength in 

abduction of the shoulder however could be from underlying adhesive capsulitis 

arthritis.)





- Psychiatric


Psychiatric: memory intact, appropriate mood/affect, intact judgment & insight





- Labs


CBC & Chem 7: 


                                 02/02/21 13:57





                                 02/02/21 14:12





HEART Score





- HEART Score


Troponin: 


                                        











Troponin T  < 0.010 ng/mL (0.00-0.029)   02/02/21  13:57

## 2021-02-05 NOTE — DISCHARGE SUMMARY
Providers





- Providers


Date of Admission: 


02/04/21 15:05





Date of discharge: 02/05/21


Attending physician: 


BLANCHE ZEE





                                        





02/02/21 17:29


Occupational Therapy Evaluate and Treat [CONS] Routine 


   Comment: 


   Reason For Exam: Neuro deficits


Physical Therapy Evaluation and Treat [CONS] Routine 


   Comment: 


   Reason For Exam: Neuro deficits





02/02/21 17:32


Speech Therapy Evaluation and Treat [CONS] Routine 


   Reason For Exam: swallow eval











Primary care physician: 


PRIMARY CARE MD








Hospitalization


Reason for admission: deconditioning, SLE exac


Condition: Stable


Disposition: DC-30 STILL A PATIENT





Exam





- Constitutional


Vitals: 


                                        











Temp Pulse Resp BP Pulse Ox


 


 98.0 F   70   18   144/88   97 


 


 02/05/21 07:54  02/05/21 10:00  02/05/21 10:00  02/05/21 09:16  02/05/21 10:00














Plan


Follow up with: 


PRIMARY CARE,MD [Primary Care Provider] - 3-5 Days

## 2021-02-05 NOTE — PROGRESS NOTE
Assessment and Plan


Assessment and plan: 





Acute right medullary infarct.  MRI brain reveals 7 x 11 mm acute to subacute 

ischemic infarct in the right medulla.  No evidence of hemorrhage





Left-sided hemiparesis.





SLE





Accelerated hypertension





Rheumatoid arthritis











2/5/2021.  MRI reveals acute right medullary infarct.  Continue Plavix and stat

in daily.  Echocardiogram revealed global left ventricular systolic function 

normal and mild concentric left ventricular hypertrophy.  EF 55 to 60%.  No ASD.

 Right ventricular systolic pressure calculated at 27 mmHg.  Carotid Dopplers 

negative.





History


Interval history: 





Patient still with weakness of the left side.





Hospitalist Physical





- Constitutional


Vitals: 


                                        











Temp Pulse Resp BP Pulse Ox


 


 98.0 F   70   18   144/88   97 


 


 02/05/21 07:54  02/05/21 10:00  02/05/21 10:00  02/05/21 09:16  02/05/21 10:00











General appearance: Present: no acute distress, well-nourished





- EENT


Eyes: Present: PERRL, EOM intact


ENT: hearing intact, clear oral mucosa, dentition normal





- Neck


Neck: Present: supple, normal ROM





- Respiratory


Respiratory effort: normal


Respiratory: bilateral: CTA





- Cardiovascular


Rhythm: regular


Heart Sounds: Present: S1 & S2.  Absent: gallop, rub





- Extremities


Extremities: no ischemia, No edema, Full ROM





- Abdominal


General gastrointestinal: soft, non-tender, non-distended, normal bowel sounds





- Integumentary


Integumentary: Present: clear, warm, dry





- Neurologic


Neurologic: CNII-XII intact, moves all extremities





HEART Score





- HEART Score


Troponin: 


                                        











Troponin T  < 0.010 ng/mL (0.00-0.029)   02/02/21  13:57    














Results





- Labs


CBC & Chem 7: 


                                 02/02/21 13:57





                                 02/02/21 14:12


Labs: 


                             Laboratory Last Values











WBC  2.9 K/mm3 (4.5-11.0)  L  02/02/21  13:57    


 


RBC  4.42 M/mm3 (3.65-5.03)   02/02/21  13:57    


 


Hgb  14.0 gm/dl (10.1-14.3)   02/02/21  13:57    


 


Hct  41.2 % (30.3-42.9)   02/02/21  13:57    


 


MCV  93 fl (79-97)   02/02/21  13:57    


 


MCH  32 pg (28-32)   02/02/21  13:57    


 


MCHC  34 % (30-34)   02/02/21  13:57    


 


RDW  13.7 % (13.2-15.2)   02/02/21  13:57    


 


Plt Count  249 K/mm3 (140-440)   02/02/21  13:57    


 


Lymph % (Auto)  39.9 % (13.4-35.0)  H  02/02/21  13:57    


 


Mono % (Auto)  13.2 % (0.0-7.3)  H  02/02/21  13:57    


 


Eos % (Auto)  0.4 % (0.0-4.3)   02/02/21  13:57    


 


Baso % (Auto)  0.4 % (0.0-1.8)   02/02/21  13:57    


 


Lymph # (Auto)  1.1 K/mm3 (1.2-5.4)  L  02/02/21  13:57    


 


Mono # (Auto)  0.4 K/mm3 (0.0-0.8)   02/02/21  13:57    


 


Eos # (Auto)  0.0 K/mm3 (0.0-0.4)   02/02/21  13:57    


 


Baso # (Auto)  0.0 K/mm3 (0.0-0.1)   02/02/21  13:57    


 


Seg Neutrophils %  46.1 % (40.0-70.0)   02/02/21  13:57    


 


Seg Neutrophils #  1.3 K/mm3 (1.8-7.7)  L  02/02/21  13:57    


 


ESR  48 mm/Hr (0-20)   02/02/21  14:12    


 


PT  12.2 Sec. (12.2-14.9)   02/02/21  13:57    


 


INR  0.92  (0.87-1.13)   02/02/21  13:57    


 


APTT  26.9 Sec. (24.2-36.6)   02/02/21  13:57    


 


Sodium  138 mmol/L (137-145)   02/02/21  14:12    


 


Potassium  4.1 mmol/L (3.6-5.0)   02/02/21  14:12    


 


Chloride  105.2 mmol/L ()   02/02/21  14:12    


 


Carbon Dioxide  27 mmol/L (22-30)   02/02/21  14:12    


 


Anion Gap  10 mmol/L  02/02/21  14:12    


 


BUN  8 mg/dL (7-17)   02/02/21  14:12    


 


Creatinine  0.7 mg/dL (0.6-1.2)   02/02/21  14:12    


 


Estimated GFR  > 60 ml/min  02/02/21  14:12    


 


BUN/Creatinine Ratio  11 %  02/02/21  14:12    


 


Glucose  91 mg/dL ()   02/02/21  14:12    


 


POC Glucose  61 mg/dL ()  L  02/02/21  13:38    


 


Calcium  10.2 mg/dL (8.4-10.2)   02/02/21  14:12    


 


Magnesium  2.00 mg/dL (1.7-2.3)   02/02/21  14:12    


 


Total Bilirubin  0.30 mg/dL (0.1-1.2)   02/02/21  14:12    


 


Direct Bilirubin  < 0.2 mg/dL (0-0.2)   02/02/21  14:12    


 


Indirect Bilirubin  0.1 mg/dL  02/02/21  14:12    


 


AST  25 units/L (5-40)   02/02/21  14:12    


 


ALT  21 units/L (7-56)   02/02/21  14:12    


 


Alkaline Phosphatase  93 units/L ()   02/02/21  14:12    


 


Total Creatine Kinase  72 units/L ()   02/02/21  14:12    


 


CK-MB (CK-2)  4.2 ng/mL (0.0-4.0)  H  02/02/21  14:12    


 


CK-MB (CK-2) Rel Index  5.8  (0-4)  H  02/02/21  14:12    


 


Troponin T  < 0.010 ng/mL (0.00-0.029)   02/02/21  13:57    


 


C-Reactive Protein  0.10 mg/dL (0.00-1.30)   02/02/21  14:12    


 


NT-Pro-B Natriuret Pep  360.2 pg/mL (0-900)   02/02/21  13:57    


 


Total Protein  9.5 g/dL (6.3-8.2)  H  02/02/21  14:12    


 


Albumin  4.4 g/dL (3.9-5)   02/02/21  14:12    


 


Albumin/Globulin Ratio  0.9 %  02/02/21  14:12    


 


Triglycerides  57 mg/dL (2-149)   02/03/21  05:47    


 


Cholesterol  149 mg/dL ()   02/03/21  05:47    


 


LDL Cholesterol Direct  98 mg/dL ()   02/03/21  05:47    


 


HDL Cholesterol  55 mg/dL (40-59)   02/03/21  05:47    


 


Cholesterol/HDL Ratio  2.70 %  02/03/21  05:47    


 


Urine Opiates Screen  Negative   02/02/21  Unknown


 


Urine Methadone Screen  Negative   02/02/21  Unknown


 


Ur Barbiturates Screen  Negative   02/02/21  Unknown


 


Ur Phencyclidine Scrn  Negative   02/02/21  Unknown


 


Ur Amphetamines Screen  Negative   02/02/21  Unknown


 


U Benzodiazepines Scrn  Negative   02/02/21  Unknown


 


Urine Cocaine Screen  Negative   02/02/21  Unknown


 


U Marijuana (THC) Screen  Negative   02/02/21  Unknown


 


Drugs of Abuse Note  Disclamer   02/02/21  Unknown














- Diagnostic Impressions


Diagnostic Impressions: 








Echocardiogram  02/02/21 17:31


Transthoracic Echocardiogram


 


Indication:


Stroke


BP:           130/71


HR:           65


 


Conclusions


 *Global left ventricular systolic function is normal.


 *Mild concentric left ventricular hypertrophy is observed.


 *The estimated ejection fraction is 55-60%. 


 *A prominent eustachian valve is noted in the right atrium.


 *No atrial septal defected is demonstrated by agitated saline contrast.


 


 *There is no evidence of aortic regurgitation.


 *There is no evidence of mitral regurgitation.


 *There is mild tricuspid regurgitation.


 *The right ventricular systolic pressure is calculated at 27 mmHg. 


 *There is trace pulmonic regurgitation. 


 


Findings     


Left Ventricle:


The left ventricular chamber size is normal. Mild concentric left


ventricular hypertrophy is observed. Global left ventricular wall motion


and contractility are within normal limits. Global left ventricular


systolic function is normal. The estimated ejection fraction is 55-60%. 


Abnormal left ventricular diastolic filling is observed, consistent with


impaired relaxation.  


 


Left Atrium:


The left atrial chamber size is normal. 


 


Right Ventricle:


The right ventricular cavity size is normal. 


 


Right Atrium:


The right atrial cavity size is normal. A prominent eustachian valve is


noted in the right atrium. No atrial septal defected is demonstrated by


agitated saline contrast.  


 


Aortic Valve:


The aortic valve structure is normal. There is no evidence of aortic


regurgitation. 


 


Mitral Valve:


Mild mitral leaflet calcification is visualized. There is no evidence of


mitral regurgitation. 


 


Tricuspid Valve:


The tricuspid valve leaflets are normal.  There is mild tricuspid


regurgitation. The right ventricular systolic pressure is calculated at


27 mmHg.  


 


Pulmonic Valve:


The pulmonic valve appears normal. There is trace pulmonic


regurgitation.  


 


Pericardium:


There is no pericardial effusion. 


 


Venous:


The inferior vena cava appears normal. 


 


Contrast:


Intravenous agitated saline contrast was used to assess intracardiac


shunting.  


 


Measurements     


Chambers 2D


Name                    Value         Normal Range            


IVSd (2D)               1.17 cm       (0.6 - 1.1)             


LVPWd (2D)              1.13 cm       (0.6 - 1.1)             


LVIDd (2D)              3.69 cm       (3.7 - 5.6)             


LVIDs (2D)              2.42 cm       (2 - 3.8)               


LV FS (2D)              34.37 %       -                        


EF Teichholz (2D)       64.29 %       -                        


Ao root diameter (2D)   2.8 cm        (2 - 3.7)               


 


Volumes/Mass


Name                    Value         Normal Range            


LA ESV SP 4CH (A/L)     33.13 ml      -                        


LA ESV SP 2CH (A/L)     51.47 ml      -                        


LA ESV BP (A/L)         43.24 ml      -                        


LA ESV BP (A/L) index   24.43 ml/m2   -                        


LA ESV SP 4CH (MOD)     31.92 ml      -                        


LA ESV SP 2CH (MOD)     48.07 ml      -                        


LA ESV BP (MOD)         40.61 ml      -                        


LA ESV BP (MOD) index   22.94 ml/m2   -                        


LV EDV SP 4CH (MOD)     47.86 ml      -                        


LV ESV SP 4CH (MOD)     21.7 ml       -                        


EF SP 4CH (MOD)         54.67 %       -                        


 


Diastolic/Systolic Function


Name                    Value         Normal Range            


MV E-wave Vmax          0.5 m/sec     -                        


MV deceleration time    267.55 msec   -                        


MV A-wave Vmax          0.75 m/sec    -                        


MV E:A ratio            0.66 ratio    -                        


 


Aortic Valve


Name                    Value         Normal Range            


AV Vmax                 1.47 m/sec    -                        


AV VTI                  19.36 cm      -                        


AV peak gradient        8.62 mmHg     -                        


AV mean gradient        3.86 mmHg     -                        


LVOT diameter           2.01 cm       -                        


LVOT Vmax               1.25 m/sec    -                        


LVOT VTI                21.02 cm      -                        


LVOT peak gradient      6.26 mmHg     -                        


LVOT mean gradient      3.35 mmHg     -                        


SV LVOT                 66.93 ml      -                        


LOLA (continuity Vmax)   2.71 cm2      -                        


LOLA (continuity VTI)    3.46 cm2      -                        


 


Tricuspid Valve


Name                    Value         Normal Range            


TR Vmax                 2.46 m/sec    -                        


TR peak gradient        24 mmHg       -                        


RAP                     3 mmHg        -                        


RVSP                    27 mmHg       -                        


 


Pulmonic Valve/Qp:Qs


Name                    Value         Normal Range            


PV Vmax                 0.91 m/sec    -                        


PV peak gradient        3.33 mmHg     -                        


IA end-diastolic Vmax   1.23 m/sec    -                        


PV acceleration time    125.59 msec   -                        


 


Electronically signed by: Brent Gamble MD on 02/03/2021 11:19:22











[place]/IV: 


                                        





Voiding Method                   Toilet


IV Catheter Type [Left Hand]     INT / Saline Lock











Active Medications





- Current Medications


Current Medications: 














Generic Name Dose Route Start Last Admin





  Trade Name Freq  PRN Reason Stop Dose Admin


 


Acetaminophen  650 mg  02/02/21 17:29  02/04/21 09:07





  Acetaminophen 325 Mg Tab  PO   650 mg





  Q4H PRN   Administration





  Pain, Mild (1-3)  


 


Amlodipine Besylate  10 mg  02/04/21 18:00  02/05/21 09:16





  Amlodipine 10 Mg Tab  PO   10 mg





  QDAY SUSHMA   Administration


 


Atorvastatin Calcium  40 mg  02/02/21 22:00  02/04/21 21:57





  Atorvastatin 40 Mg Tab  PO   40 mg





  QHS SUSHMA   Administration


 


Bisacodyl  10 mg  02/02/21 17:29 





  Bisacodyl 10 Mg Rect Supp  IA  





  QDAY PRN  





  Constipation  


 


Clopidogrel Bisulfate  75 mg  02/03/21 10:00  02/05/21 09:16





  Clopidogrel 75 Mg Tab  PO   75 mg





  QDAY SUSHMA   Administration


 


Gabapentin  300 mg  02/04/21 14:00  02/05/21 06:31





  Gabapentin 300 Mg Cap  PO   300 mg





  Q8HR SUSHMA   Administration


 


Hydralazine HCl  10 mg  02/02/21 18:42 





  Hydralazine 20 Mg/1 Ml Inj  IV  





  Q6HR PRN  





  Hypertension  


 


Hydrochlorothiazide  12.5 mg  02/02/21 18:42  02/05/21 09:16





  Hydrochlorothiazide 12.5 Mg Cap  PO   12.5 mg





  QDAY SUSHMA   Administration


 


Magnesium Hydroxide  30 ml  02/02/21 17:29 





  Magnesium Hydroxide (Mom) Oral Liqd Udc  PO  





  Q4H PRN  





  Constipation  


 


Metoclopramide HCl  10 mg  02/02/21 17:29 





  Metoclopramide 10 Mg Tab  PO  





  Q6H PRN  





  Nausea And Vomiting  


 


Ondansetron HCl  4 mg  02/02/21 17:29 





  Ondansetron 4 Mg/2 Ml Inj  IV  





  Q8H PRN  





  Nausea And Vomiting  


 


Oxycodone/Acetaminophen  1 tab  02/04/21 10:26  02/04/21 11:35





  Oxycodone /Acetaminophen 5-325mg Tab  PO   1 tab





  Q6H PRN   Administration





  Pain, Moderate (4-6)  


 


Promethazine HCl  25 mg  02/02/21 17:29 





  Promethazine 25 Mg Rect Supp  IA  





  Q6H PRN  





  Nausea And Vomiting  


 


Sodium Chloride  10 ml  02/02/21 17:29  02/04/21 21:57





  Sodium Chloride 0.9% 10 Ml Flush Syringe  IV   10 ml





  PRN PRN   Administration





  LINE FLUSH

## 2021-02-05 NOTE — DISCHARGE SUMMARY
Providers





- Providers


Date of Admission: 


02/04/21 15:05





Date of discharge: 02/05/21


Attending physician: 


BLANCHE ZEE





                                        





02/02/21 17:29


Occupational Therapy Evaluate and Treat [CONS] Routine 


   Comment: 


   Reason For Exam: Neuro deficits


Physical Therapy Evaluation and Treat [CONS] Routine 


   Comment: 


   Reason For Exam: Neuro deficits





02/02/21 17:32


Speech Therapy Evaluation and Treat [CONS] Routine 


   Reason For Exam: swallow eval











Primary care physician: 


PRIMARY CARE MD








Hospitalization


Reason for admission: cva


Condition: Stable


Hospital course: 


56 YO Female with SLE, RA, HTN presented to ED for evaluation of left-sided 

weakness.  Patient was admitted with diagnosis of CVA/left hemiparesis and 

accelerated hypertension.  The patient was placed on the CVA protocol.  Patient 

was seen by telemetry neurologist but not deemed a candidate for TPA. MRI 

reveals acute right medullary infarct.  Echocardiogram revealed global left 

ventricular systolic function normal and mild concentric left ventricular 

hypertrophy.  EF 55 to 60%.  No ASD.  Right ventricular systolic pressure 

calculated at 27 mmHg.  Carotid Dopplers negative.  Physical therapy evaluation 

recommended home health PT and hemiwalker.  The patient was discharged with 

Plavix given allergy to aspirin and statin.  Dedicated discharge time 35 minutes

.


Disposition: DC-01 TO HOME OR SELFCARE


Time spent for discharge: 35





- Discharge Diagnoses


(1) Accelerated hypertension


Status: Acute   





(2) CVA (cerebral vascular accident)


Status: Acute   


Qualifiers: 


   CVA mechanism: occlusion   Precerebral and cerebral artery: posterior 

cerebral artery   Laterality of affected vessel: left   Qualified Code(s): 

I63.532 - Cerebral infarction due to unspecified occlusion or stenosis of left 

posterior cerebral artery   





Core Measure Documentation





- Palliative Care


Palliative Care/ Comfort Measures: Not Applicable





- Core Measures


Any of the following diagnoses?: none





Exam





- Constitutional


Vitals: 


                                        











Temp Pulse Resp BP Pulse Ox


 


 98.0 F   70   18   144/88   97 


 


 02/05/21 07:54  02/05/21 10:00  02/05/21 10:00  02/05/21 09:16  02/05/21 10:00











General appearance: Present: no acute distress, well-nourished





- EENT


Eyes: Present: PERRL


ENT: hearing intact, clear oral mucosa





- Neck


Neck: Present: supple, normal ROM





- Respiratory


Respiratory effort: normal


Respiratory: bilateral: CTA





- Cardiovascular


Heart Sounds: Present: S1 & S2.  Absent: rub, click





- Extremities


Extremities: pulses symmetrical, No edema


Peripheral Pulses: within normal limits





- Abdominal


General gastrointestinal: Present: soft, non-tender, non-distended, normal bowel

 sounds


Female genitourinary: Present: normal





- Integumentary


Integumentary: Present: clear, warm, dry





- Musculoskeletal


Musculoskeletal: gait normal, strength equal bilaterally





- Psychiatric


Psychiatric: appropriate mood/affect, intact judgment & insight





- Neurologic


Neurologic: CNII-XII intact, moves all extremities





Plan


Activity: advance as tolerated


Weight Bearing Status: Weight Bear as Tolerated


Diet: low fat, low cholesterol, low salt


Special Instructions: physical therapy, home health RN


Durable Medical Equipment Needed Upon Discharge: other (Hemiwalker)


Follow up with: 


PRIMARY CARE,MD [Primary Care Provider] - 3-5 Days


Prescriptions: 


amLODIPine 10 mg PO QDAY #30 tablet


Gabapentin 300 mg PO Q8HR #90 capsule


hydroCHLOROthiazide [HCTZ] 12.5 mg PO QDAY #30 capsule


AtorvaSTATin [Lipitor] 40 mg PO QHS #30 tablet


Clopidogrel [Plavix] 75 mg PO QDAY #30 tablet

## 2021-02-05 NOTE — MAGNETIC RESONANCE REPORT
MRI BRAIN WITHOUT CONTRAST 



INDICATION / CLINICAL INFORMATION:

Stroke. Left-sided weakness



TECHNIQUE:

Multisequence, multiplanar images were obtained.



COMPARISON: 

CT head dated 2/2/2021



FINDINGS:

CEREBRAL and CEREBELLAR HEMISPHERES: A 7 x 11 mm focus of diffusion restriction is identified in the 
right medulla. No other areas of diffusion restriction are appreciated.  No midline shift.  No acute 
hemorrhage.  No extra-axial fluid collection.  Minimal chronic microangiopathy changes are noted in t
he periventricular white matter.  A subtle chronic cortical and the posterior left occipital lobe marlin
sures 1.7 cm.

VENTRICLES: Normal in size and configuration for age.  

 

VISUALIZED ORBITS: No significant abnormality.

VISUALIZED PARANASAL SINUSES: No significant abnormality.



ADDITIONAL FINDINGS: None.



IMPRESSION:

7 x 11 mm acute to subacute ischemic infarct in the right medulla. No evidence for hemorrhage.

Mild nonspecific chronic white matter changes.

Small chronic cortical infarct in the left occipital lobe.



Signer Name: Mark Montoya Jr, MD 

Signed: 2/5/2021 9:28 AM

Workstation Name: [id number]

## 2021-03-03 ENCOUNTER — HOSPITAL ENCOUNTER (OUTPATIENT)
Dept: HOSPITAL 5 - ED | Age: 56
Setting detail: OBSERVATION
LOS: 1 days | Discharge: HOME HEALTH | End: 2021-03-04
Attending: INTERNAL MEDICINE | Admitting: INTERNAL MEDICINE
Payer: COMMERCIAL

## 2021-03-03 DIAGNOSIS — Z90.49: ICD-10-CM

## 2021-03-03 DIAGNOSIS — M62.81: Primary | ICD-10-CM

## 2021-03-03 DIAGNOSIS — I10: ICD-10-CM

## 2021-03-03 DIAGNOSIS — M06.9: ICD-10-CM

## 2021-03-03 DIAGNOSIS — Z98.890: ICD-10-CM

## 2021-03-03 DIAGNOSIS — Z79.899: ICD-10-CM

## 2021-03-03 DIAGNOSIS — Z86.73: ICD-10-CM

## 2021-03-03 DIAGNOSIS — E03.9: ICD-10-CM

## 2021-03-03 LAB
ALBUMIN SERPL-MCNC: 3.9 G/DL (ref 3.9–5)
ALT SERPL-CCNC: 19 UNITS/L (ref 7–56)
APTT BLD: 26.8 SEC. (ref 24.2–36.6)
BAND NEUTROPHILS # (MANUAL): 0 K/MM3
BUN SERPL-MCNC: 13 MG/DL (ref 7–17)
BUN/CREAT SERPL: 16 %
CALCIUM SERPL-MCNC: 10 MG/DL (ref 8.4–10.2)
HCT VFR BLD CALC: 38.5 % (ref 30.3–42.9)
HEMOLYSIS INDEX: 25
HGB BLD-MCNC: 12.9 GM/DL (ref 10.1–14.3)
INR PPP: 0.91 (ref 0.87–1.13)
MCHC RBC AUTO-ENTMCNC: 34 % (ref 30–34)
MCV RBC AUTO: 94 FL (ref 79–97)
MYELOCYTES # (MANUAL): 0 K/MM3
PLATELET # BLD: 189 K/MM3 (ref 140–440)
PROMYELOCYTES # (MANUAL): 0 K/MM3
RBC # BLD AUTO: 4.1 M/MM3 (ref 3.65–5.03)
TOTAL CELLS COUNTED BLD: 100

## 2021-03-03 PROCEDURE — 82550 ASSAY OF CK (CPK): CPT

## 2021-03-03 PROCEDURE — 36415 COLL VENOUS BLD VENIPUNCTURE: CPT

## 2021-03-03 PROCEDURE — 80061 LIPID PANEL: CPT

## 2021-03-03 PROCEDURE — 84484 ASSAY OF TROPONIN QUANT: CPT

## 2021-03-03 PROCEDURE — 97162 PT EVAL MOD COMPLEX 30 MIN: CPT

## 2021-03-03 PROCEDURE — 99291 CRITICAL CARE FIRST HOUR: CPT

## 2021-03-03 PROCEDURE — 82553 CREATINE MB FRACTION: CPT

## 2021-03-03 PROCEDURE — 85610 PROTHROMBIN TIME: CPT

## 2021-03-03 PROCEDURE — 80053 COMPREHEN METABOLIC PANEL: CPT

## 2021-03-03 PROCEDURE — 85670 THROMBIN TIME PLASMA: CPT

## 2021-03-03 PROCEDURE — G0378 HOSPITAL OBSERVATION PER HR: HCPCS

## 2021-03-03 PROCEDURE — 70551 MRI BRAIN STEM W/O DYE: CPT

## 2021-03-03 PROCEDURE — 92610 EVALUATE SWALLOWING FUNCTION: CPT

## 2021-03-03 PROCEDURE — 85025 COMPLETE CBC W/AUTO DIFF WBC: CPT

## 2021-03-03 PROCEDURE — 80048 BASIC METABOLIC PNL TOTAL CA: CPT

## 2021-03-03 PROCEDURE — 93005 ELECTROCARDIOGRAM TRACING: CPT

## 2021-03-03 PROCEDURE — 87641 MR-STAPH DNA AMP PROBE: CPT

## 2021-03-03 PROCEDURE — 70450 CT HEAD/BRAIN W/O DYE: CPT

## 2021-03-03 PROCEDURE — 85730 THROMBOPLASTIN TIME PARTIAL: CPT

## 2021-03-03 PROCEDURE — 85007 BL SMEAR W/DIFF WBC COUNT: CPT

## 2021-03-03 PROCEDURE — 82962 GLUCOSE BLOOD TEST: CPT

## 2021-03-03 RX ADMIN — ACETAMINOPHEN PRN MG: 325 TABLET ORAL at 23:24

## 2021-03-03 NOTE — HISTORY AND PHYSICAL REPORT
History of Present Illness


Date of examination: 03/03/21


Date of admission: 


03/03/21 21:37





Chief complaint: 





Left sided weakness


History of present illness: 





55-year-old female with known history of hypertension, lupus and history of CVA 

in the past presenting to the emergency room today complaining of left-sided 

weakness.  Patient states that symptoms started about 4 days ago and was hoping 

that it would get better however symptoms got worse today.  She saw her primary 

care physician yesterday and thought it was a lupus however left-sided weakness 

continued to get worse.


Patient denies any headache or dizziness, no blurry vision, no nausea vomiting, 

no chest pain or shortness of breath, no fever or chills, no difficulty with 

swallowing or with speech.





Patient also indicates that she has had some pain on the left upper extremity.  

Denies any fall or trauma to the left upper extremity.


She denies any sick contacts and no recent travel.  Denies any contact with 

anyone with COVID-19..





Work-up in the emergency room today: CT scan of the head did not reveal any 

acute abnormality.





Patient is being admitted for possible CVA.





Past History


Past Medical History: arthritis (Rheumatoid arthritis), hypertension, hypoth

yroidism, other (Lupus,)


Past Surgical History: cholecystectomy


Social history: no significant social history


Family history: no significant family history





Medications and Allergies


                                    Allergies











Allergy/AdvReac Type Severity Reaction Status Date / Time


 


aspirin Allergy  Unknown Verified 02/02/21 13:31


 


lisinopril Allergy  Unknown Verified 02/02/21 13:31


 


Penicillins Allergy  Swelling Verified 02/02/21 13:31


 


Sulfa (Sulfonamide Allergy  Unknown Verified 02/02/21 13:31





Antibiotics)     











                                Home Medications











 Medication  Instructions  Recorded  Confirmed  Last Taken  Type


 


DULoxetine 20 mg PO QDAY 02/03/21 02/03/21 Unknown History


 


HCTZ 25 mg PO QAM 02/03/21 02/03/21 Unknown History


 


Neurontin 300 mg PO QDAY 02/03/21 02/03/21 Unknown History


 


Norvasc 10 mg PO QDAY 02/03/21 02/03/21 Unknown History


 


AtorvaSTATin [Lipitor] 40 mg PO QHS #30 tablet 02/05/21  Unknown Rx


 


Clopidogrel [Plavix] 75 mg PO QDAY #30 tablet 02/05/21  Unknown Rx


 


Gabapentin 300 mg PO Q8HR #90 capsule 02/05/21  Unknown Rx


 


amLODIPine 10 mg PO QDAY #30 tablet 02/05/21  Unknown Rx


 


hydroCHLOROthiazide [HCTZ] 12.5 mg PO QDAY #30 capsule 02/05/21  Unknown Rx














Review of Systems


Constitutional: no fever, no chills, no weakness


Ears, nose, mouth and throat: no nasal congestion, no sore throat


Cardiovascular: no chest pain, no palpitations


Respiratory: no cough, no shortness of breath


Gastrointestinal: no abdominal pain, no nausea, no vomiting, no diarrhea


Genitourinary Female: no pelvic pain, no flank pain, no dysuria, no hematuria


Musculoskeletal: no neck pain


Integumentary: no rash, no pruritis


Neurological: no headaches, no confusion ( )


Psychiatric: no anxiety, no depression





Exam





- Constitutional


Vitals: 


                                        











Temp Pulse Resp BP Pulse Ox


 


 98.4 F   75   18   143/113   98 


 


 03/03/21 17:02  03/03/21 22:16  03/03/21 22:16  03/03/21 22:16  03/03/21 22:16











General appearance: Present: no acute distress, well-nourished





- EENT


Eyes: Present: PERRL, EOM intact.  Absent: scleral icterus


ENT: hearing intact, clear oral mucosa, dentition normal





- Neck


Neck: Present: supple, normal ROM





- Respiratory


Respiratory effort: normal


Respiratory: bilateral: CTA





- Cardiovascular


Rhythm: regular


Heart Sounds: Present: S1 & S2.  Absent: gallop, systolic murmur, diastolic 

murmur, rub, click





- Extremities


Extremities: no ischemia, pulses intact, pulses symmetrical, No edema, normal 

temperature, normal color, abnormal (Left sided weakness)


Peripheral Pulses: within normal limits





- Abdominal


General gastrointestinal: Present: soft, non-tender, non-distended, normal bowel

 sounds.  Absent: mass





- Integumentary


Integumentary: Present: clear, warm, dry.  Absent: rash





- Musculoskeletal


Musculoskeletal: strength equal bilaterally





- Psychiatric


Psychiatric: appropriate mood/affect, intact judgment & insight, memory intact, 

cooperative





- Neurologic


Neurologic: CNII-XII intact, no focal deficits, moves all extremities, other 

(Motor 2/5 on left upper and left lower extremity)





HEART Score





- HEART Score


Troponin: 


                                        











Troponin T  < 0.010 ng/mL (0.00-0.029)   03/03/21  17:40    














Results





- Labs


CBC & Chem 7: 


                                 03/03/21 17:40





                                 03/03/21 17:40


Labs: 


                              Abnormal lab results











  03/03/21 03/03/21 03/03/21 Range/Units





  17:40 17:40 17:40 


 


WBC  2.8 L    (4.5-11.0)  K/mm3


 


Seg Neuts % (Manual)  34.0 L    (40.0-70.0)  %


 


Lymphocytes % (Manual)  39.0 H    (13.4-35.0)  %


 


Monocytes % (Manual)  23.0 H    (0.0-7.3)  %


 


Seg Neutrophils # Man  1.0 L    (1.8-7.7)  K/mm3


 


Lymphocytes # (Manual)  1.1 L    (1.2-5.4)  K/mm3


 


PT   12.1 L   (12.2-14.9)  Sec.


 


CK-MB (CK-2) Rel Index    4.8 H  (0-4)  














Assessment and Plan





- Patient Problems


(1) Left-sided weakness


Current Visit: Yes   Status: Acute   


Plan to address problem: 


Patient admitted and placed on telemetry.


We will schedule patient for MRI of the brain, carotid Doppler and 

echocardiogram.


Consult will be placed to neurology for evaluation.








(2) Essential hypertension


Current Visit: Yes   Status: Acute   


Plan to address problem: 


We will resume routine home medications and monitor vital signs closely.








(3) Rheumatoid arthritis


Current Visit: No   Status: Acute   


Plan to address problem: 


Patient will be continued on her routine home medications once reconciled.








(4) DVT prophylaxis


Current Visit: No   Status: Acute   


Plan to address problem: 


Patient placed on subcutaneous Lovenox.








(5) Full code status


Current Visit: Yes   Status: Acute   


Plan to address problem: 


Patient is a full code.

## 2021-03-03 NOTE — EMERGENCY DEPARTMENT REPORT
ED Neuro Deficit HPI





- General


Chief Complaint: Neuro Symptoms/Deficit


Stated Complaint: WEAKNESS


Time Seen by Provider: 03/03/21 17:01


Source: patient


Mode of arrival: Wheelchair


Limitations: No Limitations





- History of Present Illness


Initial Comments: 





Patient is a 55-year-old female that presents emergency room with complaints of 

worsening left-sided weakness.  Patient states she had a stroke in the past and 

left-sided feels more weak.  Patient states her symptoms started 4 days ago.  

Patient states that her left arm and left leg are feeling more heavy.  Patient 

states she saw her primary care yesterday and he thought it was her lupus.  

Patient states the weakness was continued so she came to the emergency room.  

Patient also complains of left arm pain 1 her left arm is touched.  Patient 

states the pain in her left arm is an 8 out of 10.  Patient denies headache.  

Patient denies blurry vision.  Patient denies weakness to the right side.  

Patient denies chest pain and shortness of breath.








Patient denies recent travel.  Patient denies recent international travel.  

Patient denies exposure to the novel coronavirus.  Patient denies sick contacts.

 Patient denies fever and chills.  Patient denies cough.  Patient denies 

diarrhea.  Patient denies coming in contact with anybody with symptoms of the 

novel coronavirus.








-: Sudden





- Related Data


Home Medications: 


                                Home Medications











 Medication  Instructions  Recorded  Confirmed  Last Taken


 


DULoxetine 20 mg PO QDAY 02/03/21 02/03/21 Unknown


 


HCTZ 25 mg PO QAM 02/03/21 02/03/21 Unknown


 


Neurontin 300 mg PO QDAY 02/03/21 02/03/21 Unknown


 


Norvasc 10 mg PO QDAY 02/03/21 02/03/21 Unknown








                                  Previous Rx's











 Medication  Instructions  Recorded  Last Taken  Type


 


AtorvaSTATin [Lipitor] 40 mg PO QHS #30 tablet 02/05/21 Unknown Rx


 


Clopidogrel [Plavix] 75 mg PO QDAY #30 tablet 02/05/21 Unknown Rx


 


Gabapentin 300 mg PO Q8HR #90 capsule 02/05/21 Unknown Rx


 


amLODIPine 10 mg PO QDAY #30 tablet 02/05/21 Unknown Rx


 


hydroCHLOROthiazide [HCTZ] 12.5 mg PO QDAY #30 capsule 02/05/21 Unknown Rx











Allergies/Adverse Reactions: 


                                    Allergies











Allergy/AdvReac Type Severity Reaction Status Date / Time


 


aspirin Allergy  Unknown Verified 02/02/21 13:31


 


lisinopril Allergy  Unknown Verified 02/02/21 13:31


 


Penicillins Allergy  Swelling Verified 02/02/21 13:31


 


Sulfa (Sulfonamide Allergy  Unknown Verified 02/02/21 13:31





Antibiotics)     














ED Review of Systems


ROS: 


Stated complaint: WEAKNESS


Other details as noted in HPI





Constitutional: weakness.  denies: chills, fever


Eyes: denies: eye pain, eye discharge, vision change


ENT: denies: ear pain, throat pain


Respiratory: denies: cough, shortness of breath, wheezing


Cardiovascular: denies: chest pain, palpitations


Endocrine: no symptoms reported


Gastrointestinal: denies: abdominal pain, nausea, diarrhea


Genitourinary: denies: urgency, dysuria, discharge


Musculoskeletal: denies: back pain, joint swelling, arthralgia


Skin: denies: rash, lesions


Neurological: as per HPI, weakness.  denies: headache, paresthesias


Psychiatric: denies: anxiety, depression


Hematological/Lymphatic: denies: easy bleeding, easy bruising





ED Past Medical Hx





- Past Medical History


Previous Medical History?: Yes


Hx Hypertension: Yes


Hx CVA: Yes (01/31/2021- LEFT-SIDED WEAKNESS)


Hx Arthritis: Yes (RA)


Additional medical history: LUPUS/ THYROID





- Surgical History


Past Surgical History?: Yes


Hx Cholecystectomy: Yes





- Family History


Family history: no significant





- Social History


Smoking Status: Never Smoker


Substance Use Type: None





- Medications


Home Medications: 


                                Home Medications











 Medication  Instructions  Recorded  Confirmed  Last Taken  Type


 


DULoxetine 20 mg PO QDAY 02/03/21 02/03/21 Unknown History


 


HCTZ 25 mg PO QAM 02/03/21 02/03/21 Unknown History


 


Neurontin 300 mg PO QDAY 02/03/21 02/03/21 Unknown History


 


Norvasc 10 mg PO QDAY 02/03/21 02/03/21 Unknown History


 


AtorvaSTATin [Lipitor] 40 mg PO QHS #30 tablet 02/05/21  Unknown Rx


 


Clopidogrel [Plavix] 75 mg PO QDAY #30 tablet 02/05/21  Unknown Rx


 


Gabapentin 300 mg PO Q8HR #90 capsule 02/05/21  Unknown Rx


 


amLODIPine 10 mg PO QDAY #30 tablet 02/05/21  Unknown Rx


 


hydroCHLOROthiazide [HCTZ] 12.5 mg PO QDAY #30 capsule 02/05/21  Unknown Rx














ED Neuro Physical Exam





- General


Limitations: No Limitations


General appearance: alert, in no apparent distress


Suspected Stroke: Yes





- Head


Head exam: Present: atraumatic, normocephalic





- Eye


Eye exam: Present: normal appearance





- ENT


ENT exam: Present: mucous membranes moist





- Neck


Neck exam: Present: normal inspection





- Respiratory


Respiratory exam: Present: normal lung sounds bilaterally.  Absent: respiratory 

distress





- Cardiovascular


Cardiovascular Exam: Present: regular rate, normal rhythm.  Absent: systolic 

murmur, diastolic murmur, rubs, gallop





- GI/Abdominal


GI/Abdominal exam: Present: soft, normal bowel sounds





- Extremities Exam


Extremities exam: Present: normal inspection





- Back Exam


Back exam: Present: normal inspection





- Neurological Exam


Neurological exam: Present: alert, oriented X3





- NIHSS


Assessment Interval: Baseline


1a. Level of Consciousness: alert/keenly responsive


1b. LOC Questions: answers both correctly


1c. LOC Commands: performs tasks correctly


2. Best Gaze: normal


3. Visual: no visual loss


4. Facial Palsy: normal symmetrical movement


5b. Motor Arm Right: no drift


5a. Motor Arm Left: drift


6a. Motor Leg Left: drift


6b. Motor Leg Right: no drift


7. Limb Ataxia: absent


8. Sensory: normal


9. Best Language: no aphasia


10. Dysarthria: normal


11. Extinction/Inattention: no abnormality


Total Score: 2


Stroke Severity: Minor Stroke





- Psychiatric


Psychiatric exam: Present: normal affect, normal mood





- Skin


Skin exam: Present: warm, dry, intact, normal color.  Absent: rash





ED Course


                                   Vital Signs











  03/03/21 03/03/21 03/03/21





  17:02 19:35 19:46


 


Temperature 98.4 F  


 


Pulse Rate 69  


 


Respiratory 18  





Rate   


 


Blood Pressure 180/94  175/95


 


O2 Sat by Pulse 98 99 93





Oximetry   














  03/03/21 03/03/21 03/03/21





  20:00 20:16 20:30


 


Temperature   


 


Pulse Rate   


 


Respiratory   





Rate   


 


Blood Pressure 175/95 168/96 168/96


 


O2 Sat by Pulse 100 100 99





Oximetry   














  03/03/21 03/03/21 03/03/21





  20:46 21:00 21:16


 


Temperature   


 


Pulse Rate   


 


Respiratory   





Rate   


 


Blood Pressure 185/101 185/101 180/96


 


O2 Sat by Pulse 98 100 97





Oximetry   














  03/03/21





  21:30


 


Temperature 


 


Pulse Rate 


 


Respiratory 





Rate 


 


Blood Pressure 180/96


 


O2 Sat by Pulse 99





Oximetry 














- Reevaluation(s)


Reevaluation #1: 


I discussed all results with patient.  I discussed plan of care with patient.  

Patient agrees with plan of care and admission.  Patient to be admitted to the 

hospitalist service.


03/03/21 21:24








- Consultations


Consultation #1: 


Hospitalist consulted for admission.  Hospitalist to admit patient.


03/03/21 21:27








- Lab Data


Result diagrams: 


                                 03/03/21 17:40





                                 03/03/21 17:40


                                   Lab Results











  03/03/21 03/03/21 03/03/21 Range/Units





  17:01 17:40 17:40 


 


WBC   2.8 L   (4.5-11.0)  K/mm3


 


RBC   4.10   (3.65-5.03)  M/mm3


 


Hgb   12.9   (10.1-14.3)  gm/dl


 


Hct   38.5   (30.3-42.9)  %


 


MCV   94   (79-97)  fl


 


MCH   32   (28-32)  pg


 


MCHC   34   (30-34)  %


 


RDW   13.8   (13.2-15.2)  %


 


Plt Count   189   (140-440)  K/mm3


 


Mono % (Auto)   Np   


 


Add Manual Diff   Complete   


 


Total Counted   100   


 


Seg Neutrophils %   Np   


 


Seg Neuts % (Manual)   34.0 L   (40.0-70.0)  %


 


Lymphocytes % (Manual)   39.0 H   (13.4-35.0)  %


 


Monocytes % (Manual)   23.0 H   (0.0-7.3)  %


 


Eosinophils % (Manual)   3.0   (0.0-4.3)  %


 


Basophils % (Manual)   1.0   (0.0-1.8)  %


 


Nucleated RBC %   Not Reportable   


 


Seg Neutrophils # Man   1.0 L   (1.8-7.7)  K/mm3


 


Band Neutrophils #   0.0   K/mm3


 


Lymphocytes # (Manual)   1.1 L   (1.2-5.4)  K/mm3


 


Abs React Lymphs (Man)   0.0   K/mm3


 


Monocytes # (Manual)   0.6   (0.0-0.8)  K/mm3


 


Eosinophils # (Manual)   0.1   (0.0-0.4)  K/mm3


 


Basophils # (Manual)   0.0   (0.0-0.1)  K/mm3


 


Metamyelocytes #   0.0   K/mm3


 


Myelocytes #   0.0   K/mm3


 


Promyelocytes #   0.0   K/mm3


 


Blast Cells #   0.0   K/mm3


 


WBC Morphology   Not Reportable   


 


Hypersegmented Neuts   Not Reportable   


 


Hyposegmented Neuts   Not Reportable   


 


Hypogranular Neuts   Not Reportable   


 


Smudge Cells   Not Reportable   


 


Toxic Granulation   Not Reportable   


 


Toxic Vacuolation   Not Reportable   


 


Dohle Bodies   Not Reportable   


 


Pelger-Huet Anomaly   Not Reportable   


 


Monique Rods   Not Reportable   


 


Platelet Estimate   Not Reportable   


 


Clumped Platelets   Not Reportable   


 


Plt Clumps, EDTA   Not Reportable   


 


Large Platelets   Not Reportable   


 


Giant Platelets   Not Reportable   


 


Platelet Satelliting   Not Reportable   


 


Plt Morphology Comment   Not Reportable   


 


RBC Morphology   Normal   


 


Dimorphic RBCs   Not Reportable   


 


Polychromasia   Not Reportable   


 


Hypochromasia   Not Reportable   


 


Poikilocytosis   Not Reportable   


 


Anisocytosis   Not Reportable   


 


Microcytosis   Not Reportable   


 


Macrocytosis   Not Reportable   


 


Spherocytes   Not Reportable   


 


Pappenheimer Bodies   Not Reportable   


 


Sickle Cells   Not Reportable   


 


Target Cells   Not Reportable   


 


Tear Drop Cells   Not Reportable   


 


Ovalocytes   Not Reportable   


 


Helmet Cells   Not Reportable   


 


Wetzel-[place] Bodies   Not Reportable   


 


Cabot Rings   Not Reportable   


 


Garfield Cells   Not Reportable   


 


Bite Cells   Not Reportable   


 


Crenated Cell   Not Reportable   


 


Elliptocytes   Not Reportable   


 


Acanthocytes (Spur)   Not Reportable   


 


Rouleaux   Not Reportable   


 


Hemoglobin C Crystals   Not Reportable   


 


Schistocytes   Not Reportable   


 


Malaria parasites   Not Reportable   


 


Hal Bodies   Not Reportable   


 


Hem Pathologist Commnt   No   


 


PT    12.1 L  (12.2-14.9)  Sec.


 


INR    0.91  (0.87-1.13)  


 


APTT    26.8  (24.2-36.6)  Sec.


 


Thrombin Time    17.5  (15.1-19.6)  Sec.


 


Sodium     (137-145)  mmol/L


 


Potassium     (3.6-5.0)  mmol/L


 


Chloride     ()  mmol/L


 


Carbon Dioxide     (22-30)  mmol/L


 


Anion Gap     mmol/L


 


BUN     (7-17)  mg/dL


 


Creatinine     (0.6-1.2)  mg/dL


 


Estimated GFR     ml/min


 


BUN/Creatinine Ratio     %


 


Glucose     ()  mg/dL


 


POC Glucose  75    ()  mg/dL


 


Calcium     (8.4-10.2)  mg/dL


 


Total Bilirubin     (0.1-1.2)  mg/dL


 


AST     (5-40)  units/L


 


ALT     (7-56)  units/L


 


Alkaline Phosphatase     ()  units/L


 


Total Creatine Kinase     ()  units/L


 


CK-MB (CK-2)     (0.0-4.0)  ng/mL


 


CK-MB (CK-2) Rel Index     (0-4)  


 


Troponin T     (0.00-0.029)  ng/mL


 


Total Protein     (6.3-8.2)  g/dL


 


Albumin     (3.9-5)  g/dL


 


Albumin/Globulin Ratio     %














  03/03/21 Range/Units





  17:40 


 


WBC   (4.5-11.0)  K/mm3


 


RBC   (3.65-5.03)  M/mm3


 


Hgb   (10.1-14.3)  gm/dl


 


Hct   (30.3-42.9)  %


 


MCV   (79-97)  fl


 


MCH   (28-32)  pg


 


MCHC   (30-34)  %


 


RDW   (13.2-15.2)  %


 


Plt Count   (140-440)  K/mm3


 


Mono % (Auto)   


 


Add Manual Diff   


 


Total Counted   


 


Seg Neutrophils %   


 


Seg Neuts % (Manual)   (40.0-70.0)  %


 


Lymphocytes % (Manual)   (13.4-35.0)  %


 


Monocytes % (Manual)   (0.0-7.3)  %


 


Eosinophils % (Manual)   (0.0-4.3)  %


 


Basophils % (Manual)   (0.0-1.8)  %


 


Nucleated RBC %   


 


Seg Neutrophils # Man   (1.8-7.7)  K/mm3


 


Band Neutrophils #   K/mm3


 


Lymphocytes # (Manual)   (1.2-5.4)  K/mm3


 


Abs React Lymphs (Man)   K/mm3


 


Monocytes # (Manual)   (0.0-0.8)  K/mm3


 


Eosinophils # (Manual)   (0.0-0.4)  K/mm3


 


Basophils # (Manual)   (0.0-0.1)  K/mm3


 


Metamyelocytes #   K/mm3


 


Myelocytes #   K/mm3


 


Promyelocytes #   K/mm3


 


Blast Cells #   K/mm3


 


WBC Morphology   


 


Hypersegmented Neuts   


 


Hyposegmented Neuts   


 


Hypogranular Neuts   


 


Smudge Cells   


 


Toxic Granulation   


 


Toxic Vacuolation   


 


Dohle Bodies   


 


Pelger-Huet Anomaly   


 


Monique Rods   


 


Platelet Estimate   


 


Clumped Platelets   


 


Plt Clumps, EDTA   


 


Large Platelets   


 


Giant Platelets   


 


Platelet Satelliting   


 


Plt Morphology Comment   


 


RBC Morphology   


 


Dimorphic RBCs   


 


Polychromasia   


 


Hypochromasia   


 


Poikilocytosis   


 


Anisocytosis   


 


Microcytosis   


 


Macrocytosis   


 


Spherocytes   


 


Pappenheimer Bodies   


 


Sickle Cells   


 


Target Cells   


 


Tear Drop Cells   


 


Ovalocytes   


 


Helmet Cells   


 


Wetzel-Northvale Bodies   


 


Cabot Rings   


 


Garfield Cells   


 


Bite Cells   


 


Crenated Cell   


 


Elliptocytes   


 


Acanthocytes (Spur)   


 


Rouleaux   


 


Hemoglobin C Crystals   


 


Schistocytes   


 


Malaria parasites   


 


Hal Bodies   


 


Hem Pathologist Commnt   


 


PT   (12.2-14.9)  Sec.


 


INR   (0.87-1.13)  


 


APTT   (24.2-36.6)  Sec.


 


Thrombin Time   (15.1-19.6)  Sec.


 


Sodium  137  (137-145)  mmol/L


 


Potassium  4.2  (3.6-5.0)  mmol/L


 


Chloride  104.1  ()  mmol/L


 


Carbon Dioxide  24  (22-30)  mmol/L


 


Anion Gap  13  mmol/L


 


BUN  13  (7-17)  mg/dL


 


Creatinine  0.8  (0.6-1.2)  mg/dL


 


Estimated GFR  > 60  ml/min


 


BUN/Creatinine Ratio  16  %


 


Glucose  70  ()  mg/dL


 


POC Glucose   ()  mg/dL


 


Calcium  10.0  (8.4-10.2)  mg/dL


 


Total Bilirubin  0.30  (0.1-1.2)  mg/dL


 


AST  21  (5-40)  units/L


 


ALT  19  (7-56)  units/L


 


Alkaline Phosphatase  84  ()  units/L


 


Total Creatine Kinase  49  ()  units/L


 


CK-MB (CK-2)  2.4  (0.0-4.0)  ng/mL


 


CK-MB (CK-2) Rel Index  4.8 H  (0-4)  


 


Troponin T  < 0.010  (0.00-0.029)  ng/mL


 


Total Protein  8.0  (6.3-8.2)  g/dL


 


Albumin  3.9  (3.9-5)  g/dL


 


Albumin/Globulin Ratio  1.0  %














- EKG Data


-: EKG Interpreted by Me


EKG shows normal: sinus rhythm, axis, intervals, QRS complexes, ST-T waves


Rate: normal





- Radiology Data


Radiology results: report reviewed





CT BRAIN: 3/3/2021  


 


 INDICATION / CLINICAL INFORMATION:  


 acute left sided weakness.  


 


 COMPARISON:   


 MRI brain 2/5/2021  


 


 FINDINGS:  


 


 BRAIN/INTRACRANIAL STRUCTURES: Unenhanced CT images of the brain were obtained.

  


 


 There is no definite CT evidence of acute abnormality.  


 


 Chronic left parietal cortical encephalomalacia is noted, unchanged when 

compared to the prior MRI.  


 


 On the prior exam, there was evidence of acute brainstem infarct. This is too 

small to identify in 


his chronic form by CT.  


 


 There is no evidence of hemorrhage or mass. There are no abnormal extra-axial 

fluid collections.  


 


 


 EXTRACRANIAL STRUCTURES: Unremarkable.  


 


 


 


 IMPRESSION:  


  No CT evidence of acute abnormality.  


 


=========================================================================





- Medical Decision Making





Patient is a 55-year-old female that presents emergency room with complaints of 

worsening left-sided weakness.  Patient has left-sided weakness from a previous 

stroke however the patient stating that her left side is feeling more heavy and 

weaker.  Patient symptoms started 4 days ago.  The patient's last known well 

time was 4 days ago.  Patient had a CT scan and a stroke TIA work-up.  Patient 

CT scan of the head was negative for acute findings.  Patient admitted to the [facility] service for further evaluation treatment and stroke work-up and TIA work-

up and weakness work-up.





- Differential Diagnosis


Weakness, TIA, CVA, worsening weakness, electrolyte imbalance,


Critical Care Time: Yes


Critical care time in (mins) excluding proc time.: 35


Critical care attestation.: 


If time is entered above; I have spent that time in minutes in the direct care 

of this critically ill patient, excluding procedure time.





Critical Care Time: 





35 minutes











ED Disposition


Clinical Impression: 


 Left-sided weakness, History of CVA (cerebrovascular accident), Essential 

hypertension





Disposition: DC-[id number] OP ADMIT IP TO THIS HOSP


Is pt being admited?: Yes


Does the pt Need Aspirin: No


Condition: Critical


Instructions:  Hypertension (ED)


Time of Disposition: 21:27

## 2021-03-03 NOTE — CAT SCAN REPORT
CT BRAIN: 3/3/2021



INDICATION / CLINICAL INFORMATION:

acute left sided weakness.



COMPARISON: 

MRI brain 2/5/2021



FINDINGS:



BRAIN/INTRACRANIAL STRUCTURES: Unenhanced CT images of the brain were obtained.



There is no definite CT evidence of acute abnormality.



Chronic left parietal cortical encephalomalacia is noted, unchanged when compared to the prior MRI.



On the prior exam, there was evidence of acute brainstem infarct. This is too small to identify in hi
s chronic form by CT.



There is no evidence of hemorrhage or mass. There are no abnormal extra-axial fluid collections.





EXTRACRANIAL STRUCTURES: Unremarkable.







IMPRESSION:

 No CT evidence of acute abnormality.









All CT scans at this location are performed using dose reduction to ALARA by means of automated expos
ure control.



Signer Name: Diego Ames MD 

Signed: 3/3/2021 5:34 PM

Workstation Name: [id number]

## 2021-03-04 VITALS — DIASTOLIC BLOOD PRESSURE: 86 MMHG | SYSTOLIC BLOOD PRESSURE: 142 MMHG

## 2021-03-04 LAB
BASOPHILS # (AUTO): 0 K/MM3 (ref 0–0.1)
BASOPHILS NFR BLD AUTO: 0.4 % (ref 0–1.8)
BUN SERPL-MCNC: 14 MG/DL (ref 7–17)
BUN/CREAT SERPL: 20 %
CALCIUM SERPL-MCNC: 9.8 MG/DL (ref 8.4–10.2)
EOSINOPHIL # BLD AUTO: 0 K/MM3 (ref 0–0.4)
EOSINOPHIL NFR BLD AUTO: 1.8 % (ref 0–4.3)
HCT VFR BLD CALC: 35.8 % (ref 30.3–42.9)
HDLC SERPL-MCNC: 64 MG/DL (ref 40–59)
HEMOLYSIS INDEX: 3
HGB BLD-MCNC: 11.9 GM/DL (ref 10.1–14.3)
INR PPP: 1 (ref 0.87–1.13)
LYMPHOCYTES # BLD AUTO: 1 K/MM3 (ref 1.2–5.4)
LYMPHOCYTES NFR BLD AUTO: 43.2 % (ref 13.4–35)
MCHC RBC AUTO-ENTMCNC: 33 % (ref 30–34)
MCV RBC AUTO: 95 FL (ref 79–97)
MONOCYTES # (AUTO): 0.4 K/MM3 (ref 0–0.8)
MONOCYTES % (AUTO): 15.6 % (ref 0–7.3)
PLATELET # BLD: 200 K/MM3 (ref 140–440)
RBC # BLD AUTO: 3.78 M/MM3 (ref 3.65–5.03)

## 2021-03-04 RX ADMIN — ACETAMINOPHEN PRN MG: 325 TABLET ORAL at 10:48

## 2021-03-04 RX ADMIN — GABAPENTIN SCH MG: 300 CAPSULE ORAL at 16:05

## 2021-03-04 RX ADMIN — GABAPENTIN SCH MG: 300 CAPSULE ORAL at 05:28

## 2021-03-04 NOTE — DISCHARGE SUMMARY
Providers





- Providers


Date of Admission: 


03/03/21 21:37





Date of discharge: 03/04/21


Attending physician: 


JOLYNN LUNDBERG





                                        





03/03/21


Consult to Physician [CONS] Routine 


   Comment: 


   Consulting Provider: JIMENA RODARTE


   Physician Instructions: 


   Reason For Exam: CVA





03/03/21 22:13


Consult to Dietitian/Nutrition [CONS] Routine 


   Physician Instructions: 


   Reason For Exam: 


   Reason for Consult: Nutrition Recommendations


   Reason for Consult: Diet education


Occupational Therapy Evaluate and Treat [CONS] Routine 


   Comment: 


   Reason For Exam: Neuro deficits


Physical Therapy Evaluation and Treat [CONS] Routine 


   Comment: 


   Reason For Exam: Neuro deficits





03/03/21 22:17


Speech Therapy Evaluation and Treat [CONS] Routine 


   Reason For Exam: swallow eval











Primary care physician: 


PRIMARY CARE MD








Hospitalization


Condition: Critical


Hospital course: 





55-year-old female with known history of hypertension, lupus and history of CVA 

in the past presented to the emergency room with complaining of left-sided 

weakness for 4 days which progressively got worse.  


Patient also indicates that she has had some pain on the left upper extremity.  

Denies any fall or trauma to the left upper extremity.


She denies any sick contacts and no recent travel.  Denies any contact with 

anyone with COVID-19..


Work-up in the emergency room including CT scan of the head did not reveal any 

acute abnormality.


Patient was admitted for possible CVA.


MRI brain today showed: Residual signal changes at the site of patient's 

recently documented right-sided pontine infarction, remote left PCA infarction 

with encephalomalacia, no indication of new intracranial abnormality in 

comparison to study dated 2/5/2021.


Patient had carotid Doppler and 2D echocardiogram about a month ago showed less 

than 50% stenosis bilaterally and preserved EF


Patient to discharge home today with home health PT OT once cleared by 

neurology.





Discharge diagnosis:


Left-sided questionable weakness and throbbing pain


-Likely due to neuropathy, patient already on Neurontin


-Patient had left-sided weakness during prior CVA, no new infarct on MRI


-PT recommended home health, continue home health PT OT, and home dose of 

Neurontin





Essential hypertension, continue home meds


Rheumatoid arthritis, continue routine home medications and outpatient follow-up


History of old CVA, continue Plavix and statin.  Patient is allergic to aspirin








Disposition: [id number] HOME UNDER HOME TH


Time spent for discharge: 34 minutes





Core Measure Documentation





- Palliative Care


Palliative Care/ Comfort Measures: Not Applicable





- Core Measures


Any of the following diagnoses?: history only





Exam





- Physical Exam


Narrative exam: 





General appearance: Present: no acute distress, well-nourished





- EENT


Eyes: Present: PERRL, EOM intact.  Absent: scleral icterus


ENT: hearing intact, clear oral mucosa, dentition normal





- Neck


Neck: Present: supple, normal ROM





- Respiratory


Respiratory effort: normal


Respiratory: bilateral: CTA





- Cardiovascular


Rhythm: regular


Heart Sounds: Present: S1 & S2.  Absent: gallop, systolic murmur, diastolic 

murmur, rub, click





- Extremities


Extremities: no ischemia, pulses intact, pulses symmetrical, No edema, normal 

temperature, normal color, abnormal (Left sided weakness)


Peripheral Pulses: within normal limits





- Abdominal


General gastrointestinal: Present: soft, non-tender, non-distended, normal bowel

 sounds.  Absent: mass





- Integumentary


Integumentary: Present: clear, warm, dry.  Absent: rash





- Musculoskeletal


Musculoskeletal: strength equal bilaterally





- Psychiatric


Psychiatric: appropriate mood/affect, intact judgment & insight, memory intact, 

cooperative





- Neurologic


Neurologic: CNII-XII intact, no focal deficits, moves all extremities, other 

(Motor 4/5 on left upper and left lower extremity)





- Constitutional


Vitals: 


                                        











Temp Pulse Resp BP Pulse Ox


 


 97.4 F L  65   17   103/69   100 


 


 03/04/21 04:17  03/04/21 13:45  03/04/21 10:00  03/04/21 10:49  03/04/21 10:05














Plan


Activity: fall precautions


Weight Bearing Status: Non-Weight Bearing


Diet: low fat, low salt


Special Instructions: physical therapy, home health RN


Additional Instructions: Please follow-up with rheumatologist in 1 to 2 weeks


Follow up with: 


PRIMARY CARE,MD [Primary Care Provider] - 7 Days


DIAZ GRANT MD [Staff Physician] - 7 Days

## 2021-03-04 NOTE — MAGNETIC RESONANCE REPORT
MRI BRAIN WITHOUT CONTRAST



INDICATION / CLINICAL INFORMATION:

stroke.



TECHNIQUE: 

Multiplanar, multisequence MR images of the brain were obtained.







COMPARISON: 

MRI brain 2/5/2021



CT head 3/3/2021



FINDINGS:



BRAIN / INTRACRANIAL CONTENTS:



A small focus of residual increased signal intensity is observed in the olya to the right of the midl
ine on diffusion weighted (B 1000) images. A right-sided pontine infarction was demonstrated in this 
location on MRI brain dated 2/5/2021. There is increased FLAIR and T2-weighted signal change in this 
same distribution reflecting the expected evolutionary changes of the pontine infarction.



Ventricles and cortical sulci are normal in size and configuration. There is no mass effect. No evide
nce of intracranial hemorrhage or extra-axial fluid collection is seen. Nonspecific foci of white mat
ter hyperintensity are observed in both cerebral hemispheres. A rind of periventricular hyperintensit
ies also observed. Is of microvascular ischemia. Abnormal brain parenchymal signal intensity is also 
observed in the medial aspect of the left occipital lobe and left parietal lobe secondary to remote l
eft posterior cerebral artery infarction, unchanged from prior study. No additional areas of abnormal
 brain parenchymal signal intensity are identified.  Diffusion weighted scans are otherwise negative.
 There is no indication of acute ischemic injury.



The cerebellum has an unremarkable appearance. Incidental note is made of a negative cisterna magna.



MIDLINE STRUCTURES:No abnormalities are seen to involve the pituitary gland. Pineal region has an unr
emarkable appearance.



CRANIOCERVICAL JUNCTION: No abnormalities are identified at the craniocervical junction.

VASCULAR FLOW-VOIDS: Normal flow-voids are present within the major intracranial vessels.



ORBITS: The orbits have an unremarkable appearance.



SINUSES / MASTOIDS: There is no indication of inflammatory disease in the paranasal sinuses or mastoi
d air cells.







IMPRESSION:

1. Residual signal changes at the site of the patient's recently documented right-sided pontine infar
ction.

2. Remote left PCA infarction with encephalomalacia in the medial aspect of the left occipital and pa
rietal lobes.

3. No indication of new intracranial abnormality in comparison to study dated 2/5/2021.



Signer Name: Torin Recinos MD 

Signed: 3/4/2021 10:47 AM

Workstation Name: [id number]

## 2021-03-04 NOTE — CONSULTATION
History of Present Illness


Consult date: 03/04/21


History of present illness: 





I am cancelling this neurology consult as the patient is complaining of 

throbbing pain of the left leg.





Past History


Past Medical History: arthritis (Rheumatoid arthritis), hypertension, 

hypothyroidism, other (Lupus,)


Past Surgical History: cholecystectomy


Social history: no significant social history


Family history: no significant family history





Medications and Allergies


                                    Allergies











Allergy/AdvReac Type Severity Reaction Status Date / Time


 


aspirin Allergy  Unknown Verified 02/02/21 13:31


 


lisinopril Allergy  Unknown Verified 02/02/21 13:31


 


Penicillins Allergy  Swelling Verified 02/02/21 13:31


 


Sulfa (Sulfonamide Allergy  Unknown Verified 02/02/21 13:31





Antibiotics)     











                                Home Medications











 Medication  Instructions  Recorded  Confirmed  Last Taken  Type


 


DULoxetine 20 mg PO QDAY 02/03/21 02/03/21 Unknown History


 


Neurontin 300 mg PO QDAY 02/03/21 02/03/21 Unknown History


 


Norvasc 10 mg PO QDAY 02/03/21 02/03/21 Unknown History


 


AtorvaSTATin [Lipitor] 40 mg PO QHS #30 tablet 02/05/21  Unknown Rx


 


Clopidogrel [Plavix] 75 mg PO QDAY #30 tablet 02/05/21  Unknown Rx


 


Gabapentin 300 mg PO Q8HR #90 capsule 02/05/21  Unknown Rx


 


amLODIPine 10 mg PO QDAY #30 tablet 02/05/21  Unknown Rx


 


hydroCHLOROthiazide [HCTZ] 12.5 mg PO QDAY #30 capsule 02/05/21  Unknown Rx











Active Meds: 


Active Medications





Acetaminophen (Acetaminophen 325 Mg Tab)  650 mg PO Q4H PRN


   PRN Reason: Pain MILD(1-3)/Fever >100.5/HA


   Last Admin: 03/04/21 10:48 Dose:  650 mg


   Documented by: 


Amlodipine Besylate (Amlodipine 10 Mg Tab)  10 mg PO QDAY [facility]


   Last Admin: 03/04/21 10:49 Dose:  10 mg


   Documented by: 


Atorvastatin Calcium (Atorvastatin 40 Mg Tab)  40 mg PO QHS [facility]


Bisacodyl (Bisacodyl 10 Mg Rect Supp)  10 mg [place] QDAY PRN


   PRN Reason: Constipation


Clopidogrel Bisulfate (Clopidogrel 75 Mg Tab)  75 mg PO QDAY [facility]


   Last Admin: 03/04/21 10:42 Dose:  75 mg


   Documented by: 


Enoxaparin Sodium (Enoxaparin 40 Mg/0.4 Ml Inj)  40 mg SUB-Q QDAY@2200 [facility]; 

Protocol


Gabapentin (Gabapentin 300 Mg Cap)  300 mg PO Q8HR [facility]


   Last Admin: 03/04/21 05:28 Dose:  300 mg


   Documented by: 


Hydrochlorothiazide (Hydrochlorothiazide 12.5 Mg Cap)  12.5 mg PO QDAY [facility]


   Last Admin: 03/04/21 10:42 Dose:  12.5 mg


   Documented by: 


Magnesium Hydroxide (Magnesium Hydroxide (Mom) Oral Liqd Udc)  30 ml PO Q4H PRN


   PRN Reason: Constipation


Metoclopramide HCl (Metoclopramide 10 Mg Tab)  10 mg PO Q6H PRN


   PRN Reason: Nausea And Vomiting


Ondansetron HCl (Ondansetron 4 Mg/2 Ml Inj)  4 mg IV Q8H PRN


   PRN Reason: Nausea And Vomiting


Promethazine HCl (Promethazine 25 Mg Rect Supp)  25 mg [place] Q6H PRN


   PRN Reason: Nausea And Vomiting


Sodium Chloride (Sodium Chloride 0.9% 10 Ml Flush Syringe)  10 ml IV BID [facility]


   Last Admin: 03/04/21 10:43 Dose:  10 ml


   Documented by: 


Sodium Chloride (Sodium Chloride 0.9% 10 Ml Flush Syringe)  10 ml IV PRN PRN


   PRN Reason: LINE FLUSH











Physical Examination





- Vital Signs


Vital Signs: 


                                   Vital Signs











Temp Pulse Resp BP Pulse Ox


 


 98.4 F   69   18   180/94   98 


 


 03/03/21 17:02  03/03/21 17:02  03/03/21 17:02  03/03/21 17:02  03/03/21 17:02














Results





- Laboratory Findings


CBC and BMP: 


                                 03/04/21 04:28





                                 03/04/21 04:28


Abnormal Lab Findings: 


                                  Abnormal Labs











  03/03/21 03/03/21 03/03/21





  17:40 17:40 17:40


 


WBC  2.8 L  


 


Lymph % (Auto)   


 


Mono % (Auto)   


 


Lymph # (Auto)   


 


Seg Neutrophils %   


 


Seg Neuts % (Manual)  34.0 L  


 


Lymphocytes % (Manual)  39.0 H  


 


Monocytes % (Manual)  23.0 H  


 


Seg Neutrophils #   


 


Seg Neutrophils # Man  1.0 L  


 


Lymphocytes # (Manual)  1.1 L  


 


PT   12.1 L 


 


CK-MB (CK-2) Rel Index    4.8 H


 


HDL Cholesterol   














  03/04/21 03/04/21





  04:28 04:28


 


WBC  2.3 L 


 


Lymph % (Auto)  43.2 H 


 


Mono % (Auto)  15.6 H 


 


Lymph # (Auto)  1.0 L 


 


Seg Neutrophils %  39.0 L 


 


Seg Neuts % (Manual)  


 


Lymphocytes % (Manual)  


 


Monocytes % (Manual)  


 


Seg Neutrophils #  0.9 L 


 


Seg Neutrophils # Man  


 


Lymphocytes # (Manual)  


 


PT  


 


CK-MB (CK-2) Rel Index  


 


HDL Cholesterol   64 H














Assessment and Plan





I am cancelling this Neurology consult as the patient is complaining of 

throbbing pain.

## 2021-04-15 ENCOUNTER — HOSPITAL ENCOUNTER (EMERGENCY)
Dept: HOSPITAL 5 - ED | Age: 56
Discharge: HOME | End: 2021-04-15
Payer: COMMERCIAL

## 2021-04-15 VITALS — DIASTOLIC BLOOD PRESSURE: 106 MMHG | SYSTOLIC BLOOD PRESSURE: 168 MMHG

## 2021-04-15 DIAGNOSIS — M32.9: ICD-10-CM

## 2021-04-15 DIAGNOSIS — R53.1: ICD-10-CM

## 2021-04-15 DIAGNOSIS — Z90.49: ICD-10-CM

## 2021-04-15 DIAGNOSIS — I00: ICD-10-CM

## 2021-04-15 DIAGNOSIS — G44.209: Primary | ICD-10-CM

## 2021-04-15 DIAGNOSIS — Z88.0: ICD-10-CM

## 2021-04-15 DIAGNOSIS — I10: ICD-10-CM

## 2021-04-15 DIAGNOSIS — Z86.73: ICD-10-CM

## 2021-04-15 DIAGNOSIS — Z88.2: ICD-10-CM

## 2021-04-15 DIAGNOSIS — M19.91: ICD-10-CM

## 2021-04-15 DIAGNOSIS — Z88.8: ICD-10-CM

## 2021-04-15 DIAGNOSIS — Z79.899: ICD-10-CM

## 2021-04-15 LAB
APTT BLD: < 20 SEC. (ref 24.2–36.6)
BACTERIA #/AREA URNS HPF: (no result) /HPF
BAND NEUTROPHILS # (MANUAL): 0 K/MM3
BILIRUB UR QL STRIP: (no result)
BLOOD UR QL VISUAL: (no result)
BUN SERPL-MCNC: 14 MG/DL (ref 7–17)
BUN/CREAT SERPL: 18 %
CALCIUM SERPL-MCNC: 10.1 MG/DL (ref 8.4–10.2)
HCT VFR BLD CALC: 41.2 % (ref 30.3–42.9)
HEMOLYSIS INDEX: 8
HGB BLD-MCNC: 13.6 GM/DL (ref 10.1–14.3)
INR PPP: 0.89 (ref 0.87–1.13)
MCHC RBC AUTO-ENTMCNC: 33 % (ref 30–34)
MCV RBC AUTO: 95 FL (ref 79–97)
MUCOUS THREADS #/AREA URNS HPF: (no result) /HPF
MYELOCYTES # (MANUAL): 0 K/MM3
PH UR STRIP: 6 [PH] (ref 5–7)
PLATELET # BLD: 235 K/MM3 (ref 140–440)
PROMYELOCYTES # (MANUAL): 0 K/MM3
PROT UR STRIP-MCNC: (no result) MG/DL
RBC # BLD AUTO: 4.34 M/MM3 (ref 3.65–5.03)
RBC #/AREA URNS HPF: 1 /HPF (ref 0–6)
TOTAL CELLS COUNTED BLD: 100
UROBILINOGEN UR-MCNC: < 2 MG/DL (ref ?–2)
WBC #/AREA URNS HPF: < 1 /HPF (ref 0–6)

## 2021-04-15 PROCEDURE — 85670 THROMBIN TIME PLASMA: CPT

## 2021-04-15 PROCEDURE — 80048 BASIC METABOLIC PNL TOTAL CA: CPT

## 2021-04-15 PROCEDURE — 85730 THROMBOPLASTIN TIME PARTIAL: CPT

## 2021-04-15 PROCEDURE — 70450 CT HEAD/BRAIN W/O DYE: CPT

## 2021-04-15 PROCEDURE — 81001 URINALYSIS AUTO W/SCOPE: CPT

## 2021-04-15 PROCEDURE — 85007 BL SMEAR W/DIFF WBC COUNT: CPT

## 2021-04-15 PROCEDURE — 82962 GLUCOSE BLOOD TEST: CPT

## 2021-04-15 PROCEDURE — 85025 COMPLETE CBC W/AUTO DIFF WBC: CPT

## 2021-04-15 PROCEDURE — 84484 ASSAY OF TROPONIN QUANT: CPT

## 2021-04-15 PROCEDURE — 85610 PROTHROMBIN TIME: CPT

## 2021-04-15 PROCEDURE — 36415 COLL VENOUS BLD VENIPUNCTURE: CPT

## 2021-04-15 PROCEDURE — 93005 ELECTROCARDIOGRAM TRACING: CPT

## 2021-04-15 NOTE — CAT SCAN REPORT
CT HEAD WITHOUT CONTRAST



INDICATION / CLINICAL INFORMATION:  Stroke.



TECHNIQUE: Axial imaging performed from the skull apex through the skull base without the use of cont
rast.  Sagittal and coronal reformatted images.  All CT scans at this location are performed using CT
 dose reduction for ALARA by means of automated exposure control. 



COMPARISON: 3/3/2021



FINDINGS:



CEREBRAL PARENCHYMA: No acute parenchymal abnormality is appreciated. Chronic focal cortical infarcts
 in the left parietal cortex and right occipital cortex measure 2.1 cm and 1.5 cm respectively. 

HEMORRHAGE: None.

EXTRA-AXIAL SPACES: Normal in size and morphology for the patient's age.

VENTRICULAR SYSTEM: Normal in size and morphology for the patient's age.

MIDLINE SHIFT OR HERNIATION: None.



CEREBELLUM / BRAINSTEM: No significant abnormality.



CALVARIUM: No significant abnormality.

ORBITS: Normal as visualized.

PARANASAL SINUSES / MASTOID AIR CELLS: Normal as visualized.

SOFT TISSUES of HEAD: No significant abnormality.



ADDITIONAL FINDINGS: None.



IMPRESSION:

No acute intracranial abnormality. Chronic focal cortical infarcts in the left parietal-occipital reg
ions as described. No change since 3/3/2021.



============================================

CODE STROKE:

Time of Communication (CST/CDT): 0077

Licensed Practitioner Receiving Report: Dr. Pineda 







============================================



Signer Name: Mark Montoya Jr, MD 

Signed: 4/15/2021 3:57 PM

Workstation Name: [id number]

## 2021-04-15 NOTE — EMERGENCY DEPARTMENT REPORT
ED Neuro Deficit HPI





- General


Chief Complaint: Neuro Symptoms/Deficit


Stated Complaint: SLURR SPEECH


Time Seen by Provider: 04/15/21 15:56


Source: family


Mode of arrival: Wheelchair


Limitations: Physical Limitation





- History of Present Illness


Initial Comments: 





Chief complaint: "My head was hurting this morning."





HPI: This is a 55-year-old female with history of CVA, HTN, lupus, RA who 

presents with tingling on left side according to sister.  Sister noticed left 

facial droop.  She could not remember her name.  Patient has hx of left sided 

weakness from prior CVA.  According to discharge summary, patient is on plavix. 







Patient was admitted twice recently in Feburary and March with stroke symptoms.





History obtained from sister via phone. History also obtained from patient.





PCP Dr. Alanis


-: This morning, unknown


Location: left face, other (Patient felt tingling on her right face)


History of same: Yes


Place: home


Severity: moderate


Improves With: none


Worsens With: none


Context: gradual onset


Associated Symptoms: headaches (right sided headache)


Treatments Prior to Arrival: none





- Related Data


Home Medications: 


                                Home Medications











 Medication  Instructions  Recorded  Confirmed  Last Taken


 


DULoxetine 20 mg PO QDAY 02/03/21 04/15/21 Unknown


 


Neurontin 300 mg PO QDAY 02/03/21 04/15/21 Unknown


 


Norvasc 10 mg PO QDAY 02/03/21 04/15/21 Unknown








                                  Previous Rx's











 Medication  Instructions  Recorded  Last Taken  Type


 


AtorvaSTATin [Lipitor] 40 mg PO QHS #30 tablet 02/05/21 Unknown Rx


 


Clopidogrel [Plavix] 75 mg PO QDAY #30 tablet 02/05/21 Unknown Rx


 


Gabapentin 300 mg PO Q8HR #90 capsule 02/05/21 Unknown Rx


 


amLODIPine 10 mg PO QDAY #30 tablet 02/05/21 Unknown Rx


 


hydroCHLOROthiazide [HCTZ] 12.5 mg PO QDAY #30 capsule 02/05/21 Unknown Rx











Allergies/Adverse Reactions: 


                                    Allergies











Allergy/AdvReac Type Severity Reaction Status Date / Time


 


aspirin Allergy  Unknown Verified 02/02/21 13:31


 


lisinopril Allergy  Unknown Verified 02/02/21 13:31


 


Penicillins Allergy  Swelling Verified 02/02/21 13:31


 


Sulfa (Sulfonamide Allergy  Unknown Verified 02/02/21 13:31





Antibiotics)     














ED Review of Systems


ROS: 


Stated complaint: SLURR SPEECH


Other details as noted in HPI





Comment: All other systems reviewed and negative


Constitutional: denies: fever, malaise


Respiratory: denies: cough, shortness of breath


Gastrointestinal: denies: abdominal pain, nausea, vomiting


Neurological: headache, paresthesias, other (facial droop)





ED Past Medical Hx





- Past Medical History


Previous Medical History?: Yes


Hx Hypertension: Yes


Hx CVA: Yes (01/31/2021- LEFT-SIDED WEAKNESS)


Hx Arthritis: Yes (RA)


Additional medical history: LUPUS/ THYROID





- Surgical History


Past Surgical History?: Yes


Hx Cholecystectomy: Yes





- Social History


Smoking Status: Never Smoker





- Medications


Home Medications: 


                                Home Medications











 Medication  Instructions  Recorded  Confirmed  Last Taken  Type


 


DULoxetine 20 mg PO QDAY 02/03/21 04/15/21 Unknown History


 


Neurontin 300 mg PO QDAY 02/03/21 04/15/21 Unknown History


 


Norvasc 10 mg PO QDAY 02/03/21 04/15/21 Unknown History


 


AtorvaSTATin [Lipitor] 40 mg PO QHS #30 tablet 02/05/21 04/15/21 Unknown Rx


 


Clopidogrel [Plavix] 75 mg PO QDAY #30 tablet 02/05/21 04/15/21 Unknown Rx


 


Gabapentin 300 mg PO Q8HR #90 capsule 02/05/21 04/15/21 Unknown Rx


 


amLODIPine 10 mg PO QDAY #30 tablet 02/05/21 04/15/21 Unknown Rx


 


hydroCHLOROthiazide [HCTZ] 12.5 mg PO QDAY #30 capsule 02/05/21 04/15/21 Unknown

 Rx














ED Neuro Physical Exam





- General


Limitations: Physical Limitation


General appearance: alert, in no apparent distress


Suspected Stroke: Yes





- Head


Head exam: Present: atraumatic, normocephalic





- Eye


Eye exam: Present: normal appearance





- ENT


ENT exam: Present: mucous membranes moist





- Neck


Neck exam: Present: normal inspection, full ROM





- Respiratory


Respiratory exam: Present: normal lung sounds bilaterally.  Absent: respiratory 

distress, wheezes, rales, rhonchi





- Cardiovascular


Cardiovascular Exam: Present: regular rate, normal rhythm, normal heart sounds. 

 Absent: systolic murmur, diastolic murmur, rubs, gallop





- GI/Abdominal


GI/Abdominal exam: Present: soft, normal bowel sounds.  Absent: distended, 

tenderness, guarding, rebound





- Extremities Exam


Extremities exam: Present: normal inspection





- Neurological Exam


Neurological exam: Present: alert, oriented X3





- NIHSS


Assessment Interval: Baseline


1a. Level of Consciousness: alert/keenly responsive


1b. LOC Questions: answers both correctly


1c. LOC Commands: performs tasks correctly


2. Best Gaze: normal


3. Visual: no visual loss


4. Facial Palsy: normal symmetrical movement


5b. Motor Arm Right: no drift


5a. Motor Arm Left: no drift


6a. Motor Leg Left: drift


6b. Motor Leg Right: no drift


7. Limb Ataxia: absent


8. Sensory: normal


9. Best Language: no aphasia


10. Dysarthria: normal


11. Extinction/Inattention: no abnormality


Total Score: 1


Stroke Severity: Minor Stroke





- Psychiatric


Psychiatric exam: Present: normal affect, normal mood





- Skin


Skin exam: Present: warm, dry, intact, normal color.  Absent: rash





ED Course


                                   Vital Signs











  04/15/21





  16:14


 


Temperature 98.2 F


 


Pulse Rate 90


 


Respiratory 18





Rate 


 


Blood Pressure 168/106





[Left] 


 


O2 Sat by Pulse 99





Oximetry 














- Lab Data


Result diagrams: 


                                 04/15/21 15:57





                                 04/15/21 15:57


                                   Lab Results











  04/15/21 04/15/21 04/15/21 Range/Units





  15:57 15:57 15:57 


 


WBC  3.9 L    (4.5-11.0)  K/mm3


 


RBC  4.34    (3.65-5.03)  M/mm3


 


Hgb  13.6    (10.1-14.3)  gm/dl


 


Hct  41.2    (30.3-42.9)  %


 


MCV  95    (79-97)  fl


 


MCH  31    (28-32)  pg


 


MCHC  33    (30-34)  %


 


RDW  14.5    (13.2-15.2)  %


 


Plt Count  235    (140-440)  K/mm3


 


Mono % (Auto)  Np    


 


PT   11.8 L   (12.2-14.9)  Sec.


 


INR   0.89   (0.87-1.13)  


 


APTT   < 20.0 L   (24.2-36.6)  Sec.


 


Thrombin Time     (15.1-19.6)  Sec.


 


Sodium    137  (137-145)  mmol/L


 


Potassium    3.8  (3.6-5.0)  mmol/L


 


Chloride    105.0  ()  mmol/L


 


Carbon Dioxide    20 L  (22-30)  mmol/L


 


Anion Gap    16  mmol/L


 


BUN    14  (7-17)  mg/dL


 


Creatinine    0.8  (0.6-1.2)  mg/dL


 


Estimated GFR    > 60  ml/min


 


BUN/Creatinine Ratio    18  %


 


Glucose    100  ()  mg/dL


 


Calcium    10.1  (8.4-10.2)  mg/dL


 


Troponin T    < 0.010  (0.00-0.029)  ng/mL














  04/15/21 Range/Units





  15:57 


 


WBC   (4.5-11.0)  K/mm3


 


RBC   (3.65-5.03)  M/mm3


 


Hgb   (10.1-14.3)  gm/dl


 


Hct   (30.3-42.9)  %


 


MCV   (79-97)  fl


 


MCH   (28-32)  pg


 


MCHC   (30-34)  %


 


RDW   (13.2-15.2)  %


 


Plt Count   (140-440)  K/mm3


 


Mono % (Auto)   


 


PT   (12.2-14.9)  Sec.


 


INR   (0.87-1.13)  


 


APTT   (24.2-36.6)  Sec.


 


Thrombin Time  17.5  (15.1-19.6)  Sec.


 


Sodium   (137-145)  mmol/L


 


Potassium   (3.6-5.0)  mmol/L


 


Chloride   ()  mmol/L


 


Carbon Dioxide   (22-30)  mmol/L


 


Anion Gap   mmol/L


 


BUN   (7-17)  mg/dL


 


Creatinine   (0.6-1.2)  mg/dL


 


Estimated GFR   ml/min


 


BUN/Creatinine Ratio   %


 


Glucose   ()  mg/dL


 


Calcium   (8.4-10.2)  mg/dL


 


Troponin T   (0.00-0.029)  ng/mL














- EKG Data


-: EKG Interpreted by Me


EKG shows normal: sinus rhythm, axis


Rate: normal


Interpretation: nonspecific ST-T wave donis, LVH





04/15/21 16:31


EKG obtained 1626


EKG interpreted by me


Normal sinus rhythm rate 85 bpm normal axis no ST elevation positive LVH 

prolonged CO interval normal QTC





- Radiology Data


Findings


Reporting MD: Mark Montoya


Dictation Time: April 15, 2021 14:57


: Not available


Transcription Date:


CT HEAD WITHOUT CONTRAST 


INDICATION / CLINICAL INFORMATION: Stroke. 


TECHNIQUE: Axial imaging performed from the skull apex through the skull base 

without the use of contrast. Sagittal and coronal


reformatted images. All CT scans at this location are performed using CT dose 

reduction for ALARA by means of automated


exposure control. 


COMPARISON: 3/3/2021 


FINDINGS: 


CEREBRAL PARENCHYMA: No acute parenchymal abnormality is appreciated. Chronic 

focal cortical infarcts in the left parietal


cortex and right occipital cortex measure 2.1 cm and 1.5 cm respectively. 


HEMORRHAGE: None. 


EXTRA-AXIAL SPACES: Normal in size and morphology for the patient's age. 


VENTRICULAR SYSTEM: Normal in size and morphology for the patient's age. 


MIDLINE SHIFT OR HERNIATION: None. 


CEREBELLUM / BRAINSTEM: No significant abnormality. 


CALVARIUM: No significant abnormality. 


ORBITS: Normal as visualized. 


PARANASAL SINUSES / MASTOID AIR CELLS: Normal as visualized. 


SOFT TISSUES of HEAD: No significant abnormality. 


ADDITIONAL FINDINGS: None. 


IMPRESSION: 


No acute intracranial abnormality. Chronic focal cortical infarcts in the left 

parietal-occipital regions as described. No change since


3/3/2021. 





- Medical Decision Making





I spoke with neurologist who evaluated patient. He agreed that clinical 

presentation is not clinically consistent with stroke. Patient appeared altered 

with complaints of diffuse weakness during his consultation. She was not 

cooperative with examination. I reassessed patient thereafter, she complained of

 headache and right facial tingling. I did not see evidence of neurological 

deficit from baseline. I spoke with sister per phone who observed left facial 

droop.





I spoke with radiologist Dr. Montoya who informed me that patient did not have 

any acute changes on CT scan from previous studies.





Patient is now at [facility].  I suspect tension headache with possible re-

emergence syndrome.  Patient has MRI brain scheduled tomorrow by rheumatologist.

  


Critical Care Time: Yes


Critical care time in (mins) excluding proc time.: 40


Critical care attestation.: 


If time is entered above; I have spent that time in minutes in the direct care 

of this critically ill patient, excluding procedure time.


40 minutes of critical care time excluding procedures were used in the care of 

the patient.   I came immediately to the bedside upon patient's arrival.  I  I 

discussed treatment plan with the nursing team members. I reviewed electronic 

record.  I kept the family member informed.  Patient required multiple 

interventions and reassessments. I discussed care with multiple consultants 

including radiologist and neurologist.





ED Disposition


Clinical Impression: 


 Tension headache, Generalized weakness, History of CVA (cerebrovascular 

accident), SLE (systemic lupus erythematosus related syndrome), Rheumatoid 

arthritis





Disposition: DC-01 TO HOME OR SELFCARE


Is pt being admited?: No


Does the pt Need Aspirin: No


Condition: Stable


Instructions:  Tension Headache, Adult, Transient Ischemic Attack, Easy-to-Read

## 2021-04-15 NOTE — CONSULTATION
History of Present Illness





- Reason for Consult


Consult date: 04/15/21





- History of Present Illness





[facility] Consult Note





# Demographics


Consult Type: Acute Stroke Level 2 (4.5-24 hrs)


Patient Location: Emergency Room


First Name: Leanne


Last Name: Alejandro


YOB: 1965


Age: 55


Gender: Female


Time of Initial Page (Eastern Time): 04/15/2021, 15:44


Time of Return Call (Eastern Time): 04/15/2021, 15:44





# HPI


History: 56yo woman who was found by family on the ground. She was last normal 

at 7AM today. She is not able to provide much history, and states that she is 

raising her arms and legs, when she is not doing this.





She had stroke in February.





# Scores


Time of exam and NIHSS (Eastern Time): 04/15/2021, 15:51


Level of Consciousness 1a: [1] = Not alert; but arousable by minor stim


LOC Questions 1b: [0] = Answers both questions correctly


LOC Commands 1c: [1] = Performs one correctly


Best Gaze 2: [0] = Normal


Visual 3: [0] = No visual loss


Facial Palsy 4: [0] = Normal symmetrical movements


Motor Arm Left 5a: [1] = Drift


Motor Arm Right 5b: [1] = Drift


Motor Leg Left 6a: [3] = No effort against gravity


Motor Leg Right 6b: [3] = No effort against gravity


Limb Ataxia 7: [0] = Absent


Sensory 8: [0] = Normal


Best Language 9: [0] = No aphasia


Dysarthria 10: [0] = Normal


Extinction and Inattention 11: [0] = No abnormality


NIHSS Total: 10





# Exam


Vitals: vital signs reviewed





# Data


Time Head CT personally read by me (Eastern Time): 04/15/2021, 15:48


Head CT: no bleed





# Assessment


Impression: Altered Mental Status, w complaints of diffuse weakness. She is 

poorly cooperative with examination.





# Plan


Thrombolytic/Intervention: NOT IV Thrombolytic or IA Intervention


Thrombolytic Exclusion (< 3 hour window): stroke within 3 months


Thrombolytic Exclusion: > 4.5 hours


Intraarterial Exclusion: clinically not consistent with stroke


Additional Recommendations: 1. would recc metabolic wood.


2. if she does not improve, would admit for further evaluation and MRI brain











Medications and Allergies


                                    Allergies











Allergy/AdvReac Type Severity Reaction Status Date / Time


 


aspirin Allergy  Unknown Verified 02/02/21 13:31


 


lisinopril Allergy  Unknown Verified 02/02/21 13:31


 


Penicillins Allergy  Swelling Verified 02/02/21 13:31


 


Sulfa (Sulfonamide Allergy  Unknown Verified 02/02/21 13:31





Antibiotics)     











                                Home Medications











 Medication  Instructions  Recorded  Confirmed  Last Taken  Type


 


DULoxetine 20 mg PO QDAY 02/03/21 02/03/21 Unknown History


 


Neurontin 300 mg PO QDAY 02/03/21 02/03/21 Unknown History


 


Norvasc 10 mg PO QDAY 02/03/21 02/03/21 Unknown History


 


AtorvaSTATin [Lipitor] 40 mg PO QHS #30 tablet 02/05/21  Unknown Rx


 


Clopidogrel [Plavix] 75 mg PO QDAY #30 tablet 02/05/21  Unknown Rx


 


Gabapentin 300 mg PO Q8HR #90 capsule 02/05/21  Unknown Rx


 


amLODIPine 10 mg PO QDAY #30 tablet 02/05/21  Unknown Rx


 


hydroCHLOROthiazide [HCTZ] 12.5 mg PO QDAY #30 capsule 02/05/21  Unknown Rx

## 2021-04-16 NOTE — ELECTROCARDIOGRAPH REPORT
[facility]

                                       

Test Date:    2021-04-15               Test Time:    16:26:56

Pat Name:     NATALIO GRANDA        Department:   

Patient ID:   [id number]          Room:          

Gender:       F                        Technician:   ZENAIDA

:          1965               Requested By: ASA UMANA

Order Number: [id number]              Reading MD:   Faye Flood

                                 Measurements

Intervals                              Axis          

Rate:         84                       P:            61

NV:           216                      QRS:          -10

QRSD:         83                       T:            126

QT:           373                                    

QTc:          440                                    

                           Interpretive Statements

Sinus rhythm

Prolonged NV interval

LVH with secondary repolarization abnormality

Borderline ST elevation, inferior leads

No previous ECG available for comparison

Electronically Signed On 2021 17:11:38 EDT by Faye Flood